# Patient Record
Sex: FEMALE
[De-identification: names, ages, dates, MRNs, and addresses within clinical notes are randomized per-mention and may not be internally consistent; named-entity substitution may affect disease eponyms.]

---

## 2018-04-12 ENCOUNTER — HOSPITAL ENCOUNTER (INPATIENT)
Dept: HOSPITAL 42 - ED | Age: 58
LOS: 1 days | Discharge: ANOTHER HOSPITAL | DRG: 871 | End: 2018-04-13
Attending: STUDENT IN AN ORGANIZED HEALTH CARE EDUCATION/TRAINING PROGRAM | Admitting: STUDENT IN AN ORGANIZED HEALTH CARE EDUCATION/TRAINING PROGRAM
Payer: COMMERCIAL

## 2018-04-12 VITALS — BODY MASS INDEX: 24.7 KG/M2

## 2018-04-12 DIAGNOSIS — R65.20: ICD-10-CM

## 2018-04-12 DIAGNOSIS — K70.10: ICD-10-CM

## 2018-04-12 DIAGNOSIS — E16.2: ICD-10-CM

## 2018-04-12 DIAGNOSIS — A41.9: Primary | ICD-10-CM

## 2018-04-12 DIAGNOSIS — N17.9: ICD-10-CM

## 2018-04-12 DIAGNOSIS — K72.00: ICD-10-CM

## 2018-04-12 DIAGNOSIS — I10: ICD-10-CM
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
## 2018-04-12 DIAGNOSIS — Z80.1: ICD-10-CM

## 2018-04-12 DIAGNOSIS — E86.0: ICD-10-CM

## 2018-04-12 DIAGNOSIS — F10.10: ICD-10-CM

## 2018-04-12 DIAGNOSIS — F17.210: ICD-10-CM

## 2018-04-12 DIAGNOSIS — K81.0: ICD-10-CM

## 2018-04-12 DIAGNOSIS — K76.0: ICD-10-CM

## 2018-04-12 DIAGNOSIS — E87.2: ICD-10-CM

## 2018-04-12 DIAGNOSIS — Z80.0: ICD-10-CM

## 2018-04-12 DIAGNOSIS — D68.4: ICD-10-CM

## 2018-04-12 LAB
ALBUMIN SERPL-MCNC: 3.5 G/DL (ref 3–4.8)
ALBUMIN SERPL-MCNC: 4.4 G/DL (ref 3–4.8)
ALBUMIN/GLOB SERPL: 1.5 {RATIO} (ref 1.1–1.8)
ALBUMIN/GLOB SERPL: 1.7 {RATIO} (ref 1.1–1.8)
AMYLASE SERPL-CCNC: 121 U/L (ref 35–125)
ANISOCYTOSIS BLD QL SMEAR: SLIGHT
APAP SERPL-MCNC: < 10 UG/ML (ref 10–20)
APPEARANCE UR: (no result)
APTT BLD: 42.4 SECONDS (ref 25.1–36.5)
BACTERIA #/AREA URNS HPF: (no result) /[HPF]
BASE EXCESS BLDV CALC-SCNC: -0.3 MMOL/L (ref 0–2)
BASE EXCESS BLDV CALC-SCNC: -1.7 MMOL/L (ref 0–2)
BASE EXCESS BLDV CALC-SCNC: -11.9 MMOL/L (ref 0–2)
BASOPHILS # BLD AUTO: 0.01 K/MM3 (ref 0–2)
BASOPHILS NFR BLD: 0.1 % (ref 0–3)
BILIRUB UR-MCNC: (no result) MG/DL
BUN SERPL-MCNC: 20 MG/DL (ref 7–21)
BUN SERPL-MCNC: 22 MG/DL (ref 7–21)
CALCIUM SERPL-MCNC: 10.8 MG/DL (ref 8.4–10.5)
CALCIUM SERPL-MCNC: 9.8 MG/DL (ref 8.4–10.5)
COLOR UR: (no result)
D DIMER PPP FEU-MCNC: > 5980 NG/ML (ref 0–243)
EOSINOPHIL # BLD: 0 10*3/UL (ref 0–0.7)
EOSINOPHIL NFR BLD: 0 % (ref 1.5–5)
ERYTHROCYTE [DISTWIDTH] IN BLOOD BY AUTOMATED COUNT: 14.5 % (ref 11.5–14.5)
GFR NON-AFRICAN AMERICAN: 36
GFR NON-AFRICAN AMERICAN: 39
GLUCOSE UR STRIP-MCNC: NEGATIVE MG/DL
GRANULOCYTES # BLD: 17.79 10*3/UL (ref 1.4–6.5)
GRANULOCYTES NFR BLD: 94.3 % (ref 50–68)
HGB BLD-MCNC: 14.3 G/DL (ref 12–16)
INR PPP: 5.01 (ref 0.93–1.08)
INR PPP: 8.62 (ref 0.93–1.08)
LEUKOCYTE ESTERASE UR-ACNC: (no result) LEU/UL
LIPASE SERPL-CCNC: 108 U/L (ref 23–300)
LYMPHOCYTE: 4 % (ref 22–35)
LYMPHOCYTES # BLD: 0.7 10*3/UL (ref 1.2–3.4)
LYMPHOCYTES NFR BLD AUTO: 3.8 % (ref 22–35)
MACROCYTES BLD QL SMEAR: SLIGHT
MCH RBC QN AUTO: 34.6 PG (ref 25–35)
MCHC RBC AUTO-ENTMCNC: 33.6 G/DL (ref 31–37)
MCV RBC AUTO: 103.1 FL (ref 80–105)
MONOCYTE: 1 % (ref 1–6)
MONOCYTES # BLD AUTO: 0.3 10*3/UL (ref 0.1–0.6)
MONOCYTES NFR BLD: 1.8 % (ref 1–6)
NEUTROPHILS NFR BLD AUTO: 90 % (ref 50–70)
NEUTS BAND NFR BLD: 5 % (ref 0–2)
PH BLDV: 7.17 [PH] (ref 7.32–7.43)
PH BLDV: 7.43 [PH] (ref 7.32–7.43)
PH BLDV: 7.46 [PH] (ref 7.32–7.43)
PH UR STRIP: 5.5 [PH] (ref 4.7–8)
PLATELET # BLD EST: NORMAL 10*3/UL
PLATELET # BLD: 230 10^3/UL (ref 120–450)
PMV BLD AUTO: 10.8 FL (ref 7–11)
PROT UR STRIP-MCNC: 100 MG/DL
PROTHROMBIN TIME: 102.7 SECONDS (ref 9.4–12.5)
PROTHROMBIN TIME: 59.5 SECONDS (ref 9.4–12.5)
RBC # BLD AUTO: 4.13 10^6/UL (ref 3.5–6.1)
RBC # UR STRIP: (no result) /UL
SALICYLATES SERPL-MCNC: < 1 MG/DL (ref 2–20)
SP GR UR STRIP: >= 1.03 (ref 1–1.03)
TROPONIN I SERPL-MCNC: 0.01 NG/ML
UROBILINOGEN UR STRIP-ACNC: 0.2 E.U./DL
VENOUS BLOOD FIO2: 21 %
VENOUS BLOOD GAS PCO2: 32 (ref 40–60)
VENOUS BLOOD GAS PCO2: 33 (ref 40–60)
VENOUS BLOOD GAS PCO2: 45 (ref 40–60)
VENOUS BLOOD GAS PO2: 41 MM/HG (ref 30–55)
VENOUS BLOOD GAS PO2: 50 MM/HG (ref 30–55)
VENOUS BLOOD GAS PO2: 71 MM/HG (ref 30–55)
WBC # BLD AUTO: 18.8 10^3/UL (ref 4.5–11)

## 2018-04-12 PROCEDURE — 30233K1 TRANSFUSION OF NONAUTOLOGOUS FROZEN PLASMA INTO PERIPHERAL VEIN, PERCUTANEOUS APPROACH: ICD-10-PCS | Performed by: STUDENT IN AN ORGANIZED HEALTH CARE EDUCATION/TRAINING PROGRAM

## 2018-04-12 RX ADMIN — PIPERACILLIN SODIUM AND TAZOBACTAM SODIUM SCH MLS/HR: .25; 2 INJECTION, POWDER, LYOPHILIZED, FOR SOLUTION INTRAVENOUS at 18:55

## 2018-04-12 RX ADMIN — PREDNISOLONE SODIUM PHOSPHATE SCH MG: 15 SOLUTION ORAL at 18:59

## 2018-04-12 NOTE — CP.PCM.CON
History of Present Illness





- History of Present Illness


History of Present Illness: 


Surgery Consult Note (Lei):





57 year old female with a past medical history significant for alcohol abuse (

last drink 48 hours PTA), tobacco abuse and HTN who presented with SOB, chest 

pain and abdominal pain of three days duration. Patient reports that her 

abdominal pain started three days ago while at rest. She describes the pain as 

diffusely sharp and cramping that has been constant since it started. She 

denies relation of the abdominal pain to PO intake. However, two days ago 

patient tried to drink a diet fudge soda and immediately experienced non-bloody 

emesis. Since that time, she has put herself on a clear liquid diet and reports 

that she has had no episodes of vomiting. She reports that she has not had a BM 

since the pain started but that she is passing flatus with small amounts of 

mucus. She denies recent hospitalization, recent travel, recent/current 

antibiotic use, fever, chills, weight loss, dysphagia, nausea, diarrhea, 

constipation, changes in urine output, skin changes or any numbness/tingling of 

any extremity.





PMH: As written above


PSH:  (~) and ?perineural cyst removal


Family History: Mother-Lung cancer and Father: Colon Cancer and MS


Social History: Current pack per day smoker with 30 year pack smoking history, 

chronic alcohol abuse (binge drinking) and denies any illicit drug use; Lives 

at home with daughter


Allergies: NKDA


Home Medications: Denies





PMD: Jazmyn





Review of Systems





- Review of Systems


Review of Systems: 


As per HPI, otherwise negative





Past Patient History





- Past Social History


Smoking Status: Never Smoked





- CARDIAC


Hx Cardiac Disorders: No





- PULMONARY


Hx Respiratory Disorders: No





- NEUROLOGICAL


Hx Neurological Disorder: No





- HEENT


Hx HEENT Problems: No





- RENAL


Hx Chronic Kidney Disease: No





- ENDOCRINE/METABOLIC


Hx Endocrine Disorders: No





- HEMATOLOGICAL/ONCOLOGICAL


Hx Blood Disorders: No





- INTEGUMENTARY


Hx Dermatological Problems: No





- MUSCULOSKELETAL/RHEUMATOLOGICAL


Hx Musculoskeletal Disorders: No





- GASTROINTESTINAL


Hx Gastrointestinal Disorders: No





- GENITOURINARY/GYNECOLOGICAL


Hx Genitourinary Disorders: No





- PSYCHIATRIC


Hx Depression: No


Hx Emotional Abuse: No


Hx Physical Abuse: No


Hx Substance Use: No





- SURGICAL HISTORY


Hx Surgeries: No





Meds


Allergies/Adverse Reactions: 


 Allergies











Allergy/AdvReac Type Severity Reaction Status Date / Time


 


No Known Allergies Allergy   Verified 18 11:07














- Medications


Medications: 


 Current Medications





Sodium Chloride (Sodium Chloride 0.9%)  1,000 mls @ 100 mls/hr IV .Q10H MELLISSA


   Last Admin: 18 12:43 Dose:  100 mls/hr











Physical Exam





- Constitutional


Appears: Non-toxic, No Acute Distress





- Head Exam


Head Exam: ATRAUMATIC, NORMOCEPHALIC





- Eye Exam


Eye Exam: EOMI, Scleral icterus.  absent: Normal appearance


Pupil Exam: NORMAL ACCOMODATION, PERRL





- ENT Exam


ENT Exam: Mucous Membranes Dry





- Neck Exam


Neck exam: Positive for: Full Rom





- Respiratory Exam


Respiratory Exam: NORMAL BREATHING PATTERN.  absent: Accessory Muscle Use





- Cardiovascular Exam


Cardiovascular Exam: Tachycardia, REGULAR RHYTHM





- GI/Abdominal Exam


GI & Abdominal Exam: Hypoactive Bowel Sounds, Soft, Tenderness (Diffusely TTP; 

Increased TTP in RUQ noted with positive murphys sign).  absent: Distended, Firm

, Guarding, Hernia, Rebound, Rigid





- Extremities Exam


Extremities exam: Positive for: normal capillary refill, pedal pulses present





- Neurological Exam


Neurological exam: Alert, Oriented x3





- Psychiatric Exam


Psychiatric exam: Normal Mood





- Skin


Skin Exam: Dry, Warm





Results





- Vital Signs


Recent Vital Signs: 


 Last Vital Signs











Temp  94.9 F L  18 12:49


 


Pulse  99 H  18 13:40


 


Resp  19   18 13:40


 


BP  108/74   18 13:40


 


Pulse Ox  100   18 13:40














- Labs


Result Diagrams: 


 18 12:08





 18 12:08


Labs: 


 Laboratory Results - last 24 hr











  18





  11:34 12:08 12:08


 


WBC   


 


RBC   


 


Hgb   


 


Hct   


 


MCV   


 


MCH   


 


MCHC   


 


RDW   


 


Plt Count   


 


MPV   


 


Gran %   


 


Lymph % (Auto)   


 


Mono % (Auto)   


 


Eos % (Auto)   


 


Baso % (Auto)   


 


Gran #   


 


Lymph # (Auto)   


 


Mono # (Auto)   


 


Eos # (Auto)   


 


Baso # (Auto)   


 


Neutrophils % (Manual)   


 


Band Neutrophils %   


 


Lymphocytes % (Manual)   


 


Monocytes % (Manual)   


 


Platelet Evaluation   


 


Anisocytosis (manual)   


 


Macrocytosis (manual)   


 


PT   


 


INR   


 


APTT   


 


D-Dimer, Quantitative   


 


pO2   


 


VBG pH   


 


VBG pCO2   


 


VBG HCO3   


 


VBG Total CO2   


 


VBG O2 Sat (Calc)   


 


VBG Base Excess   


 


VBG Potassium   


 


Glucose   


 


Lactate   


 


FiO2   


 


Sodium   135 


 


Potassium   3.8 


 


Chloride   91 L 


 


Carbon Dioxide   17 L 


 


Anion Gap   31 H 


 


BUN   20 


 


Creatinine   1.5 H 


 


Est GFR ( Amer)   43 


 


Est GFR (Non-Af Amer)   36 


 


POC Glucose (mg/dL)  < 20 L*  


 


Random Glucose   24 L* 


 


Calcium   10.8 H 


 


Phosphorus   


 


Magnesium   1.4 L 


 


Total Bilirubin   4.9 H 


 


AST   41261 H 


 


ALT   6554 H 


 


Alkaline Phosphatase   162 H 


 


Lactate Dehydrogenase   53289 H 


 


Total Creatine Kinase   79 


 


Troponin I   0.01 


 


Total Protein   7.0 


 


Albumin   4.4 


 


Globulin   2.6 


 


Albumin/Globulin Ratio   1.7 


 


Amylase   121 


 


Lipase   108 


 


Venous Blood Potassium   


 


Salicylates    < 1 L


 


Acetaminophen    < 10.0 L


 


Alcohol, Quantitative   


 


Influenza Typ A,B (EIA)   


 


Blood Type   


 


Antibody Screen   


 


BBK History Checked   














  18





  12:08 12:08 12:08


 


WBC   18.8 H 


 


RBC   4.13 


 


Hgb   14.3 


 


Hct   42.6 


 


MCV   103.1 


 


MCH   34.6 


 


MCHC   33.6 


 


RDW   14.5 


 


Plt Count   230 


 


MPV   10.8 


 


Gran %   94.3 H 


 


Lymph % (Auto)   3.8 L 


 


Mono % (Auto)   1.8 


 


Eos % (Auto)   0.0 L 


 


Baso % (Auto)   0.1 


 


Gran #   17.79 H 


 


Lymph # (Auto)   0.7 L 


 


Mono # (Auto)   0.3 


 


Eos # (Auto)   0.0 


 


Baso # (Auto)   0.01 


 


Neutrophils % (Manual)   90 H 


 


Band Neutrophils %   5 H 


 


Lymphocytes % (Manual)   4 L 


 


Monocytes % (Manual)   1 


 


Platelet Evaluation   Normal 


 


Anisocytosis (manual)   Slight 


 


Macrocytosis (manual)   Slight 


 


PT    59.5 H


 


INR    5.01 H*


 


APTT    42.4 H


 


D-Dimer, Quantitative    > 5980 H


 


pO2   


 


VBG pH   


 


VBG pCO2   


 


VBG HCO3   


 


VBG Total CO2   


 


VBG O2 Sat (Calc)   


 


VBG Base Excess   


 


VBG Potassium   


 


Glucose   


 


Lactate   


 


FiO2   


 


Sodium   


 


Potassium   


 


Chloride   


 


Carbon Dioxide   


 


Anion Gap   


 


BUN   


 


Creatinine   


 


Est GFR ( Amer)   


 


Est GFR (Non-Af Amer)   


 


POC Glucose (mg/dL)   


 


Random Glucose   


 


Calcium   


 


Phosphorus   


 


Magnesium   


 


Total Bilirubin   


 


AST   


 


ALT   


 


Alkaline Phosphatase   


 


Lactate Dehydrogenase   


 


Total Creatine Kinase   


 


Troponin I   


 


Total Protein   


 


Albumin   


 


Globulin   


 


Albumin/Globulin Ratio   


 


Amylase   


 


Lipase   


 


Venous Blood Potassium   


 


Salicylates   


 


Acetaminophen   


 


Alcohol, Quantitative  11 H  


 


Influenza Typ A,B (EIA)   


 


Blood Type   


 


Antibody Screen   


 


BBK History Checked   














  18





  12:23 12:23 12:23


 


WBC   


 


RBC   


 


Hgb   


 


Hct   


 


MCV   


 


MCH   


 


MCHC   


 


RDW   


 


Plt Count   


 


MPV   


 


Gran %   


 


Lymph % (Auto)   


 


Mono % (Auto)   


 


Eos % (Auto)   


 


Baso % (Auto)   


 


Gran #   


 


Lymph # (Auto)   


 


Mono # (Auto)   


 


Eos # (Auto)   


 


Baso # (Auto)   


 


Neutrophils % (Manual)   


 


Band Neutrophils %   


 


Lymphocytes % (Manual)   


 


Monocytes % (Manual)   


 


Platelet Evaluation   


 


Anisocytosis (manual)   


 


Macrocytosis (manual)   


 


PT   


 


INR   


 


APTT   


 


D-Dimer, Quantitative   


 


pO2  50  


 


VBG pH  7.17 L*  


 


VBG pCO2  45.0  


 


VBG HCO3  16.4 L  


 


VBG Total CO2  17.8 L  


 


VBG O2 Sat (Calc)  86.1 H  


 


VBG Base Excess  -11.9 L  


 


VBG Potassium  3.9  


 


Glucose  163 H  


 


Lactate  16.4 H*  


 


FiO2  21.0  


 


Sodium  131.0 L  


 


Potassium   


 


Chloride  87.0 L  


 


Carbon Dioxide   


 


Anion Gap   


 


BUN   


 


Creatinine   


 


Est GFR ( Amer)   


 


Est GFR (Non-Af Amer)   


 


POC Glucose (mg/dL)   


 


Random Glucose   


 


Calcium   


 


Phosphorus   


 


Magnesium   


 


Total Bilirubin   


 


AST   


 


ALT   


 


Alkaline Phosphatase   


 


Lactate Dehydrogenase   


 


Total Creatine Kinase   


 


Troponin I   


 


Total Protein   


 


Albumin   


 


Globulin   


 


Albumin/Globulin Ratio   


 


Amylase   


 


Lipase   


 


Venous Blood Potassium  3.9  


 


Salicylates   


 


Acetaminophen   


 


Alcohol, Quantitative   


 


Influenza Typ A,B (EIA)    Negative for flu a/b


 


Blood Type   A POSITIVE 


 


Antibody Screen   Negative 


 


BBK History Checked   No verified bt 














  18





  12:37 12:55


 


WBC  


 


RBC  


 


Hgb  


 


Hct  


 


MCV  


 


MCH  


 


MCHC  


 


RDW  


 


Plt Count  


 


MPV  


 


Gran %  


 


Lymph % (Auto)  


 


Mono % (Auto)  


 


Eos % (Auto)  


 


Baso % (Auto)  


 


Gran #  


 


Lymph # (Auto)  


 


Mono # (Auto)  


 


Eos # (Auto)  


 


Baso # (Auto)  


 


Neutrophils % (Manual)  


 


Band Neutrophils %  


 


Lymphocytes % (Manual)  


 


Monocytes % (Manual)  


 


Platelet Evaluation  


 


Anisocytosis (manual)  


 


Macrocytosis (manual)  


 


PT  


 


INR  


 


APTT  


 


D-Dimer, Quantitative  


 


pO2  


 


VBG pH  


 


VBG pCO2  


 


VBG HCO3  


 


VBG Total CO2  


 


VBG O2 Sat (Calc)  


 


VBG Base Excess  


 


VBG Potassium  


 


Glucose  


 


Lactate  


 


FiO2  


 


Sodium  


 


Potassium  


 


Chloride  


 


Carbon Dioxide  


 


Anion Gap  


 


BUN  


 


Creatinine  


 


Est GFR ( Amer)  


 


Est GFR (Non-Af Amer)  


 


POC Glucose (mg/dL)  113 H 


 


Random Glucose  


 


Calcium  


 


Phosphorus   5.3 H


 


Magnesium  


 


Total Bilirubin  


 


AST  


 


ALT  


 


Alkaline Phosphatase  


 


Lactate Dehydrogenase  


 


Total Creatine Kinase  


 


Troponin I  


 


Total Protein  


 


Albumin  


 


Globulin  


 


Albumin/Globulin Ratio  


 


Amylase  


 


Lipase  


 


Venous Blood Potassium  


 


Salicylates  


 


Acetaminophen  


 


Alcohol, Quantitative  


 


Influenza Typ A,B (EIA)  


 


Blood Type  


 


Antibody Screen  


 


BBK History Checked  














- EKG Data


EKG shows normal: Sinus rhythm


Rate: Tachycardia





Assessment & Plan





- Assessment and Plan (Free Text)


Assessment: 


57 year old female with RUQ abdominal pain, sepsis and elevated LFT's. Surgery 

was consulted for evaluation of ?peritonitis vs. acute hepatic/biliary pathology


Plan: 





-CT Abdomen/Pelvis reviewed


-F/U Abdominal U/S


-Continue IVF and antibiotics


-Will discuss with attending








RENÉ Malik PGY1





- Date & Time


Date: 18


Time: 14:23

## 2018-04-12 NOTE — ED PDOC
Arrival/HPI





- General


Chief Complaint: Shortness Of Breath


Time Seen by Provider: 04/12/18 11:21


Historian: Patient, Family (daughter)





- History of Present Illness


Narrative History of Present Illness (Text): 





04/12/18 12:05


Patient is a 56 yo female, smokes at least 1 pack per day, also with history of 

"drinking on and off" presents to the Emergency Department with history of 

cough "for a while" but over past two days has had pain to her chest and left 

arm, as well as increased abdominal pain over past 2-3 days per her history. 

She also states that over the past "several days" she has been very tired and 

is too weak to walk down two flights of steps today. States chest and abdominal 

pain is worse with coughing, not worse with deep breaths. Denies calf pain or 

swelling. States that she feels her stomach is bloated. Reports dark stools at 

times, but no gross blood. Patient has had intermittent dizziness and blurred 

vision over "past few weeks". She denies headache. Denies focal arm or leg 

weakness. She states that she has not had an appetite for several days.


Patient states last drink was on Monday, daughter believes that she last had 

drink of alcohol 2 days ago. Daughter states that patient appeared to be 

"slurred" when she spoke to her this morning. No trauma reported. No bruising 

reported.





Past Medical History





- Past Medical History


Past Medical History: No Previous





- Cardiac


Hx Cardiac Disorders: No





- Pulmonary


Hx Respiratory Disorders: No





- Neurological


Hx Neurological Disorder: No





- HEENT


Hx HEENT Disorder: No





- Renal


Hx Renal Disorder: No





- Endocrine/Metabolic


Hx Endocrine Disorders: No





- Hematological/Oncological


Hx Blood Disorders: No





- Integumentary


Hx Dermatological Disorder: No





- Musculoskeletal/Rheumatological


Hx Musculoskeletal Disorders: No





- Gastrointestinal


Hx Gastrointestinal Disorders: No





- Genitourinary/Gynecological


Hx Genitourinary Disorders: No





- Psychiatric


Hx Depression: No


Hx Emotional Abuse: No


Hx Physical Abuse: No


Hx Substance Use: No





- Past Surgical History


Past Surgical History: No Previous





- Suicidal Assessment


Feels Threatened In Home Enviroment: No





Family/Social History


Family/Social History: Unknown Family HX


Smoking Status: Never Smoked


Hx Alcohol Use: No


Hx Substance Use: No





Allergies/Home Meds


Allergies/Adverse Reactions: 


Allergies





No Known Allergies Allergy (Verified 04/12/18 11:07)


 








Home Medications: 


 Home Meds











 Medication  Instructions  Recorded  Confirmed


 


No Known Home Med [No Known Home  08/15/14 04/12/18





Med]   














Review of Systems





- Review of Systems


Systems not reviewed;Unavailable: Other (patient is poor historian)


Constitutional: Fatigue, Weight Change.  absent: Fevers, Night Sweats


Eyes: Vision Changes (intermittent blurred vision).  absent: Eye Pain


ENT: absent: Hearing Changes, Sore Throat


Respiratory: SOB, Cough.  absent: Sputum, Wheezing


Cardiovascular: Chest Pain, DESAI.  absent: Palpitations, Edema, Calf Pain, 

Orthopnea, Syncope


Gastrointestinal: Abdominal Pain, Stool Changes, Nausea, Appetite Changes, 

Other (occasional dark stools).  absent: Constipation, Diarrhea, Vomiting, 

Hematochezia, Hematemesis, Food Intolerance


Genitourinary Female: absent: Vaginal Bleeding, Vaginal Discharge


Musculoskeletal: absent: Back Pain, Neck Pain


Skin: absent: Rash


Neurological: Dizziness.  absent: Headache, Focal Weakness


Endocrine: absent: Polyuria


Hemo/Lymphatic: absent: Easy Bleeding


Psychiatric: absent: Anxiety, Depression





Physical Exam


Vital Signs Reviewed: Yes


Vital Signs











  Temp Pulse Resp BP Pulse Ox


 


 04/12/18 15:07  96.9 F L    


 


 04/12/18 14:07   95 H  19  120/71  99


 


 04/12/18 13:40   99 H  19  108/74  100


 


 04/12/18 12:49  94.9 F L    


 


 04/12/18 11:23    19  


 


 04/12/18 11:07  97.5 F L  115 H  22  143/70  98











Temperature: Afebrile


Blood Pressure: Hypertensive


Pulse: Tachycardic


Respiratory Rate: Normal


Appearance: Positive for: Ill-Appearing, Uncomfortable


Pain Distress: Moderate


Mental Status: Positive for: Alert and Oriented X 3


Finger Stick Blood Glucose: 20





- Systems Exam


Head: Present: Atraumatic, Normocephalic


Pupils: Present: PERRL


Extroacular Muscles: Present: EOMI


Conjunctiva: Present: Icteric


Mouth: Present: Dry


Pharnyx: No: ERYTHEMA, Strider


Nose (Internal): Present: Normal Inspection


Neck: Present: Normal Range of Motion.  No: Meningeal Signs, MIDLINE TENDERNESS


Respiratory/Chest: Present: Clear to Auscultation.  No: Wheezes, Rales, Rhonchi


Cardiovascular: Present: Murmurs, Tachycardic


Abdomen: Present: Tenderness, Distention, Other (diffuse tenderness, no 

pulsatile masses, no incarcerated hernia).  No: Rebound, Guarding


Rectal: No: Gross Blood


Upper Extremity: Present: NORMAL PULSES, Other (pain on palpation of right 

upper shoulder, worse with ROM).  No: Edema


Lower Extremity: Present: NORMAL PULSES, Neurovascularly Intact.  No: Edema


Neurological: Present: Motor Func Grossly Intact, Normal Sensory Function


Skin: Present: Pale


Psychiatric: Present: Alert, Normal Insight, Normal Concentration





Medical Decision Making


ED Course and Treatment: 





04/12/18 12:15


Patient is a 56 yo female presents with daughter with complaints of cough, 

chest pain, abdominal pain, weakness. History is supplemented by daughter. 

Patient admits that she is a heavy smoker and "will drink heavy on and off".


On initial exam, she is found to have bedside glucose "less than 20". She is 

alert and answering questions appropriately despite this. IV line established 

and patient given amp of D50. She denies prior history of diabetes. She admits 

that she has not seen her PMD for several years.


Her initial exam reveals clear lungs, oxygen saturations of 99% on room air. 

Exam notable for diffuse abdominal pain, with distension.


Initial ddx includes alcohol withdrawal, gi bleed, sepsis, pancreatitis, 

hepatitis, coronary artery disease, pulmonary embolism, COPD, dehydration, 

peritonitis. Not limited to this.





Labs drawn and pending. IV fluids ordered and pepcid ordered.


Plan to obtain CT chest/abdomen/pelvis.


Treatment plan reviewed with patient and daughter.


Currently no focal weakness noted on exam with no facial droop or slurred 

speech noted.


Suspect earlier described slurred speech at home due to low blood sugar, 

although if this persists will consider Head CT.





04/12/2018 12:11


Chest X-ray


IMPRESSION: No active disease.


Dictator: Alvaro Abrams MD





04/12/18 12:37


Code Sepsis called.





04/12/2018 13:56


Chest/Abd/Pelvis CT


IMPRESSION:


Mild mural thickening in the descending and sigmoid colo, possible colitis.


There is some stranding of the fat planes anterior to the pancreas, possible 

pancreatitis.


Sever fatty infiltration of the liver. Dense sludge in the gallbladder.


Dictator: Alvaro Abrams MD





04/12/2018 14:53


Abdominal Ultrasound


IMPRESSION: 


1. Gallbladder wall thickening, pericholecystic fluid and hyperemia in the 

anterior gallbladder wall could represent acute cholecystitis in the 

appropriate clinical setting. Clinical correlation and follow-up is advised.


2. Mild hepatomegaly and hepatic steatosis.


Dictator: Argentina Sanford MD





There is significant elevation of ast, alt, bilirubin, ldh. Suspect acute liver 

failure based on labs, possible component of alcoholic hepatitis but suspicious 

of other acute infectious or inflammatory process given history and extremely 

abnormal labs.


She has significant improvement after d50 in terms of her interaction and is at 

her baseline. 


I suspect initial slurred speech at home due to hypoglycemia as resolved after 

d50.





INR , pt, ptt significantly elevated, currently no melena or active bleeding 

noted although concerning for acute liver failure.


Dr. Ruelas intensivist consulted.


Oncall gastroenterologist Dr. Shukla consulted directly by me through ED and 

abnormal labs, patient's exam and vital signs commnicated.


Patient with serial exams is alert, conversive, comfortable, ambulatory.


Given ct and ultarsound findings surgery consulted. IV anbitiotics initiated 

and iv fluids continued.


Patient and daughter updated with treatment plan, abnormal labs, ddx at this 

moment and need for icu monitoring given acute renal failure and serial exams.


After consultation with intensivist, acetadote ordered.


Dr. Shukla to evaluate patient.


Care turned over to icu team and consultants at 1700.





Patient with resolution of chest pain, and has equal and strong pulses in both 

upper extremities. Ddimer elevated although I suspect this is related to 

underlying sepsis and acute liver failure, as patient currently with no chest 

pain or pleuritic discomfort or hypoxia will admit to icu for serial exams and 

further follow-up of labs and symptoms.





- Critical Care


Critical Care Minutes: 60 minutes





- Lab Interpretations


Lab Results: 








 04/12/18 12:08 





 04/12/18 12:08 





 Lab Results





04/12/18 13:07: Blood Type Confirm A POSITIVE


04/12/18 12:55: Phosphorus 5.3 H


04/12/18 12:37: POC Glucose (mg/dL) 113 H


04/12/18 12:23: Influenza Typ A,B (EIA) Negative for flu a/b


04/12/18 12:23: Blood Type A POSITIVE, Antibody Screen Negative, BBK History 

Checked No verified bt


04/12/18 12:23: pO2 50, VBG pH 7.17 L*, VBG pCO2 45.0, VBG HCO3 16.4 L, VBG 

Total CO2 17.8 L, VBG O2 Sat (Calc) 86.1 H, VBG Base Excess -11.9 L, VBG 

Potassium 3.9, Glucose 163 H, Lactate 16.4 H*, FiO2 21.0, Sodium 131.0 L, 

Chloride 87.0 L, Venous Blood Potassium 3.9


04/12/18 12:08: PT 59.5 H, INR 5.01 H*, APTT 42.4 H, D-Dimer, Quantitative > 

5980 H


04/12/18 12:08: WBC 18.8 H, RBC 4.13, Hgb 14.3, Hct 42.6, .1, MCH 34.6, 

MCHC 33.6, RDW 14.5, Plt Count 230, MPV 10.8, Gran % 94.3 H, Lymph % (Auto) 3.8 

L, Mono % (Auto) 1.8, Eos % (Auto) 0.0 L, Baso % (Auto) 0.1, Gran # 17.79 H, 

Lymph # (Auto) 0.7 L, Mono # (Auto) 0.3, Eos # (Auto) 0.0, Baso # (Auto) 0.01, 

Neutrophils % (Manual) 90 H, Band Neutrophils % 5 H, Lymphocytes % (Manual) 4 L

, Monocytes % (Manual) 1, Platelet Evaluation Normal, Anisocytosis (manual) 

Slight, Macrocytosis (manual) Slight


04/12/18 12:08: Alcohol, Quantitative 11 H


04/12/18 12:08: Salicylates < 1 L, Acetaminophen < 10.0 L


04/12/18 12:08: Sodium 135, Potassium 3.8, Chloride 91 L, Carbon Dioxide 17 L, 

Anion Gap 31 H, BUN 20, Creatinine 1.5 H, Est GFR ( Amer) 43, Est GFR (

Non-Af Amer) 36, Random Glucose 24 L*, Calcium 10.8 H, Magnesium 1.4 L, Total 

Bilirubin 4.9 H, AST 30083 H, ALT 6554 H, Alkaline Phosphatase 162 H, Lactate 

Dehydrogenase 32212 H, Total Creatine Kinase 79, Troponin I 0.01, Total Protein 

7.0, Albumin 4.4, Globulin 2.6, Albumin/Globulin Ratio 1.7, Amylase 121, Lipase 

108


04/12/18 11:34: POC Glucose (mg/dL) < 20 L*











- RAD Interpretation


Radiology Orders: 








04/12/18 11:47


CHEST PORTABLE [RAD] Stat 





04/12/18 12:39


CHEST,ABDOMEN, PELVIS W/O CONT [CT] Stat 





04/12/18 13:27


ABDOMEN COMPLETE [US] Stat 











: Radiologist





- EKG Interpretation


EKG Interpretation (Text): 





04/12/18 12:14


EKG at 11:04 sinus tachycardia rate of 114


Interpreted by ED Physician: Yes


Type: 12 lead EKG





- Medication Orders


Current Medication Orders: 








Piperacillin Sod/Tazobactam Sod (Zosyn 2.25 Gm In 0.9% 100 Ml)  2.25 gm in 100 

mls @ 200 mls/hr IVPB Q6 MELLISSA


   PRN Reason: Protocol


   Last Admin: 04/12/18 18:55  Dose: 200 mls/hr





eMAR Start Stop


 Document     04/12/18 18:55  JUR  (Rec: 04/12/18 18:56  JUR  [facility]-[id number])


     Intravenous Solution


      Start Date                                 04/12/18


      Start Time                                 18:56


      End Date                                   04/12/18


      End time                                   19:16


      Total Infusion Time                        20





Acetylcysteine 3,000 mg/ (Dextrose)  515 mls @ 125 mls/hr IV .Q4H8M ONE


   PRN Reason: Protocol


   Stop: 04/12/18 23:07


   Last Admin: 04/12/18 19:00  Dose: 125 mls/hr





eMAR Start Stop


 Document     04/12/18 19:00  JUR  (Rec: 04/12/18 19:00  JUR  [facility]-[id number])


     Intravenous Solution


      Start Date                                 04/12/18


      Start Time                                 19:00





Acetylcysteine 6,120 mg/ (Dextrose)  1,030.6 mls @ 62.5 mls/hr IVPB ONCE ONE


   Stop: 04/13/18 15:29


Dextrose/Sodium Chloride (Dextrose 5%/0.9% Ns 1000 Ml)  1,000 mls @ 100 mls/hr 

IV .Q10H MELLISSA


   Last Admin: 04/12/18 21:51  Dose: 100 mls/hr





eMAR Start Stop


 Document     04/12/18 21:51  B.P  (Rec: 04/12/18 21:51  B.P  [facility]-[id number])


     Intravenous Solution


      Start Date                                 04/12/18


      Start Time                                 21:51





Lactulose (Enulose)  30 gm PO BID [facility]


   Last Admin: 04/12/18 19:42  Dose: 30 gm





Prednisolone (Prednisolone Oral Soln)  40 mg PO DAILY [facility]


   Last Admin: 04/12/18 18:59  Dose: 40 mg





Discontinued Medications





Dextrose (Dextrose 50% Inj)  50 ml IVP ONCE ONE


   Stop: 04/12/18 11:49


   Last Admin: 04/12/18 11:48  Dose: 50 ml





IVP Administration


 Document     04/12/18 11:48  LA  (Rec: 04/12/18 12:45  LA  [id number])


     Charges for Administration


      # of IVP Administrations                   1





Famotidine (Pepcid)  20 mg IVP STAT STA


   Stop: 04/12/18 11:49


   Last Admin: 04/12/18 12:43  Dose: 20 mg





IVP Administration


 Document     04/12/18 12:43  LA  (Rec: 04/12/18 12:43  LA  [id number])


     Charges for Administration


      # of IVP Administrations                   1





Sodium Chloride (Sodium Chloride 0.9%)  1,000 mls @ 100 mls/hr IV .Q10H [facility]


   Last Admin: 04/12/18 12:43  Dose: 100 mls/hr





eMAR Start Stop


 Document     04/12/18 12:43  LA  (Rec: 04/12/18 12:44  LA  [id number])


     Intravenous Solution


      Start Date                                 04/12/18


      Start Time                                 12:43


      End Date                                   04/12/18


      End time                                   12:53


      Total Infusion Time                        10





Lactated Ringer's 1,770 ml/ IV (SUPPLIES)  1,770 mls @ 3,538.02 mls/hr IV ONCE 

ONE


   PRN Reason: 60 ML/KG/HR


   Stop: 04/12/18 12:38


   Last Admin: 04/12/18 12:53  Dose: 3,538.02 mls/hr





eMAR Start Stop


 Document     04/12/18 12:53  LA  (Rec: 04/12/18 12:54  LA  [id number])


     Intravenous Solution


      Start Date                                 04/12/18


      Start Time                                 12:54


      End Date                                   04/12/18


      End time                                   13:25


      Total Infusion Time                        31





Piperacillin Sod/Tazobactam Sod (Zosyn 4.5 Gm In Ns 100ml)  4.5 gm in 100 mls @ 

200 mls/hr IVPB STAT STA


   PRN Reason: Protocol


   Stop: 04/12/18 13:10


   Last Admin: 04/12/18 12:56  Dose: 200 mls/hr





eMAR Start Stop


 Document     04/12/18 12:56  LA  (Rec: 04/12/18 12:57  LA  [id number])


     Intravenous Solution


      Start Date                                 04/12/18


      Start Time                                 12:56


      End Date                                   04/12/18


      End time                                   13:26


      Total Infusion Time                        30





Acetylcysteine 8,850 mg/ (Dextrose)  244.25 mls @ 200 mls/hr IV .Q1H14M ONE


   Stop: 04/12/18 16:00


   Last Admin: 04/12/18 15:28  Dose: 200 mls/hr





eMAR Start Stop


 Document     04/12/18 15:28  LA  (Rec: 04/12/18 15:29  LA  [id number])


     Intravenous Solution


      Start Date                                 04/12/18


      Start Time                                 15:29


      End Date                                   04/12/18


      End time                                   16:45


      Total Infusion Time                        76





Dextrose/Sodium Chloride (Dextrose 5%/0.45% Ns 1000 Ml)  1,000 mls @ 100 mls/hr 

IV .Q10H MELLISSA


   Last Admin: 04/12/18 15:32  Dose: 100 mls/hr





eMAR Start Stop


 Document     04/12/18 15:32  LA  (Rec: 04/12/18 15:33  LA  [id number])


     Intravenous Solution


      Start Date                                 04/12/18


      Start Time                                 15:32





Acetylcysteine 3,000 mg/ (Dextrose)  265 mls @ 62.5 mls/hr IV .Q4H15M ONE


   PRN Reason: Protocol


   Stop: 04/12/18 23:14


Phytonadione 10 mg/ Sodium (Chloride)  51 mls @ 100 mls/hr IV ONCE ONE


   Stop: 04/12/18 22:10











Disposition/Present on Arrival





- Present on Arrival


Any Indicators Present on Arrival: No


History of DVT/PE: No


History of Uncontrolled Diabetes: No


Urinary Catheter: No


History of Decub. Ulcer: No


History Surgical Site Infection Following: None





- Disposition


Have Diagnosis and Disposition been Completed?: Yes


Diagnosis: 


 Acute liver failure, Abnormal coagulation profile, Leukocytosis, Sepsis, 

Abdominal pain, Colitis, Gallbladder disorder, Chest pain, Hypoglycemia





Disposition: HOSPITALIZED


Disposition Time: 15:00


Patient Plan: Admission, ICU


Patient Problems: 


 Current Active Problems











Problem Status Onset


 


Abdominal pain Acute  


 


Abnormal coagulation profile Acute  


 


Acute liver failure Acute  


 


Chest pain Acute  


 


Colitis Acute  


 


Gallbladder disorder Acute  


 


Hypoglycemia Acute  


 


Leukocytosis Acute  


 


Sepsis Acute  











Condition: CRITICAL

## 2018-04-12 NOTE — CP.PCM.CON
<Braydon Mix - Last Filed: 18 17:20>





History of Present Illness





- History of Present Illness


History of Present Illness: 





Critical Care Consult Note: 





57 year old female with a PMHx of ETOH abuse (last drink 3-4 days ago), tobacco 

abuse, and HTN who presents to the ED with SOB, chest pain and abdominal pain. 

Patient states that her symptoms initially started 3 days ago and have gotten 

progressively worse. The pain was initially just her chest but now includes her 

abdomen as well. She denies any radiation of her chest pain.  She also 

complains of progressive shortness of breath. The abdominal pain is constant, 

diffusely sharp and cramping 10/10 in severity at its worse. patient admits to 

binge drinking. She denies any complications associated with her drinking. 

Denies any recent ingestion of Tylenol. She denies recent hospitalization or 

recent travel. No other complaints. 





12 Point ROS performed and neg other than stated above. 





PMH: ETOH abuse (last drink 3-4 days ago), tobacco abuse, and HTN


PSH:  (~) and pilonidal cyst removal


Family History: Mother-Lung cancer and RA -  Father: Colon Cancer and MS


Social History: Current pack per day smoker with 30 year pack smoking history, 

chronic alcohol abuse (binge drinking) >10 years of drinking; and denies any 

illicit drug use; Lives at home with daughter


Allergies: NKDA








Review of Systems





- Review of Systems


All systems: reviewed and no additional remarkable complaints except (HPI)





Past Patient History





- Past Social History


Smoking Status: Never Smoked





- CARDIAC


Hx Cardiac Disorders: No





- PULMONARY


Hx Respiratory Disorders: No





- NEUROLOGICAL


Hx Neurological Disorder: No





- HEENT


Hx HEENT Problems: No





- RENAL


Hx Chronic Kidney Disease: No





- ENDOCRINE/METABOLIC


Hx Endocrine Disorders: No





- HEMATOLOGICAL/ONCOLOGICAL


Hx Blood Disorders: No





- INTEGUMENTARY


Hx Dermatological Problems: No





- MUSCULOSKELETAL/RHEUMATOLOGICAL


Hx Musculoskeletal Disorders: No





- GASTROINTESTINAL


Hx Gastrointestinal Disorders: No





- GENITOURINARY/GYNECOLOGICAL


Hx Genitourinary Disorders: No





- PSYCHIATRIC


Hx Depression: No


Hx Emotional Abuse: No


Hx Physical Abuse: No


Hx Substance Use: No





- SURGICAL HISTORY


Hx Surgeries: No





Meds


Allergies/Adverse Reactions: 


 Allergies











Allergy/AdvReac Type Severity Reaction Status Date / Time


 


No Known Allergies Allergy   Verified 18 11:07














- Medications


Medications: 


 Current Medications





Sodium Chloride (Sodium Chloride 0.9%)  1,000 mls @ 100 mls/hr IV .Q10H MELLISSA


   Last Admin: 18 12:43 Dose:  100 mls/hr


Acetylcysteine 8,850 mg/ (Dextrose)  244.25 mls @ 200 mls/hr IV .Q1H14M ONE


   Stop: 18 16:00


Dextrose/Sodium Chloride (Dextrose 5%/0.45% Ns 1000 Ml)  1,000 mls @ 100 mls/hr 

IV .Q10H [facility]











Physical Exam





- Constitutional


Appears: No Acute Distress





- Head Exam


Head Exam: ATRAUMATIC, NORMOCEPHALIC





- Eye Exam


Eye Exam: EOMI, PERRL





- ENT Exam


ENT Exam: Mucous Membranes Moist





- Respiratory Exam


Respiratory Exam: Clear to Auscultation Bilateral.  absent: Rales, Wheezes





- Cardiovascular Exam


Cardiovascular Exam: REGULAR RHYTHM, RRR, +S1, +S2





- GI/Abdominal Exam


GI & Abdominal Exam: Guarding, Normal Bowel Sounds, Tenderness.  absent: 

Distended





- Extremities Exam


Extremities exam: Negative for: calf tenderness, pedal edema





- Neurological Exam


Neurological exam: Alert, Oriented x3





- Psychiatric Exam


Psychiatric exam: Normal Affect, Normal Mood





- Skin


Skin Exam: Dry, Intact, Warm





Results





- Vital Signs


Recent Vital Signs: 


 Last Vital Signs











Temp  96.9 F L  18 15:07


 


Pulse  95 H  18 14:07


 


Resp  19   18 14:07


 


BP  120/71   18 14:07


 


Pulse Ox  99   18 14:07














- Labs


Result Diagrams: 


 18 12:08





 18 12:08


Labs: 


 Laboratory Results - last 24 hr











  18





  11:34 12:08 12:08


 


WBC   


 


RBC   


 


Hgb   


 


Hct   


 


MCV   


 


MCH   


 


MCHC   


 


RDW   


 


Plt Count   


 


MPV   


 


Gran %   


 


Lymph % (Auto)   


 


Mono % (Auto)   


 


Eos % (Auto)   


 


Baso % (Auto)   


 


Gran #   


 


Lymph # (Auto)   


 


Mono # (Auto)   


 


Eos # (Auto)   


 


Baso # (Auto)   


 


Neutrophils % (Manual)   


 


Band Neutrophils %   


 


Lymphocytes % (Manual)   


 


Monocytes % (Manual)   


 


Platelet Evaluation   


 


Anisocytosis (manual)   


 


Macrocytosis (manual)   


 


PT   


 


INR   


 


APTT   


 


D-Dimer, Quantitative   


 


pO2   


 


VBG pH   


 


VBG pCO2   


 


VBG HCO3   


 


VBG Total CO2   


 


VBG O2 Sat (Calc)   


 


VBG Base Excess   


 


VBG Potassium   


 


Glucose   


 


Lactate   


 


FiO2   


 


Sodium   135 


 


Potassium   3.8 


 


Chloride   91 L 


 


Carbon Dioxide   17 L 


 


Anion Gap   31 H 


 


BUN   20 


 


Creatinine   1.5 H 


 


Est GFR ( Amer)   43 


 


Est GFR (Non-Af Amer)   36 


 


POC Glucose (mg/dL)  < 20 L*  


 


Random Glucose   24 L* 


 


Calcium   10.8 H 


 


Phosphorus   


 


Magnesium   1.4 L 


 


Total Bilirubin   4.9 H 


 


AST   65169 H 


 


ALT   6554 H 


 


Alkaline Phosphatase   162 H 


 


Lactate Dehydrogenase   66629 H 


 


Total Creatine Kinase   79 


 


Troponin I   0.01 


 


Total Protein   7.0 


 


Albumin   4.4 


 


Globulin   2.6 


 


Albumin/Globulin Ratio   1.7 


 


Amylase   121 


 


Lipase   108 


 


Venous Blood Potassium   


 


Salicylates    < 1 L


 


Acetaminophen    < 10.0 L


 


Alcohol, Quantitative   


 


Influenza Typ A,B (EIA)   


 


Blood Type   


 


Blood Type Confirm   


 


Antibody Screen   


 


BBK History Checked   














  18





  12:08 12:08 12:08


 


WBC   18.8 H 


 


RBC   4.13 


 


Hgb   14.3 


 


Hct   42.6 


 


MCV   103.1 


 


MCH   34.6 


 


MCHC   33.6 


 


RDW   14.5 


 


Plt Count   230 


 


MPV   10.8 


 


Gran %   94.3 H 


 


Lymph % (Auto)   3.8 L 


 


Mono % (Auto)   1.8 


 


Eos % (Auto)   0.0 L 


 


Baso % (Auto)   0.1 


 


Gran #   17.79 H 


 


Lymph # (Auto)   0.7 L 


 


Mono # (Auto)   0.3 


 


Eos # (Auto)   0.0 


 


Baso # (Auto)   0.01 


 


Neutrophils % (Manual)   90 H 


 


Band Neutrophils %   5 H 


 


Lymphocytes % (Manual)   4 L 


 


Monocytes % (Manual)   1 


 


Platelet Evaluation   Normal 


 


Anisocytosis (manual)   Slight 


 


Macrocytosis (manual)   Slight 


 


PT    59.5 H


 


INR    5.01 H*


 


APTT    42.4 H


 


D-Dimer, Quantitative    > 5980 H


 


pO2   


 


VBG pH   


 


VBG pCO2   


 


VBG HCO3   


 


VBG Total CO2   


 


VBG O2 Sat (Calc)   


 


VBG Base Excess   


 


VBG Potassium   


 


Glucose   


 


Lactate   


 


FiO2   


 


Sodium   


 


Potassium   


 


Chloride   


 


Carbon Dioxide   


 


Anion Gap   


 


BUN   


 


Creatinine   


 


Est GFR ( Amer)   


 


Est GFR (Non-Af Amer)   


 


POC Glucose (mg/dL)   


 


Random Glucose   


 


Calcium   


 


Phosphorus   


 


Magnesium   


 


Total Bilirubin   


 


AST   


 


ALT   


 


Alkaline Phosphatase   


 


Lactate Dehydrogenase   


 


Total Creatine Kinase   


 


Troponin I   


 


Total Protein   


 


Albumin   


 


Globulin   


 


Albumin/Globulin Ratio   


 


Amylase   


 


Lipase   


 


Venous Blood Potassium   


 


Salicylates   


 


Acetaminophen   


 


Alcohol, Quantitative  11 H  


 


Influenza Typ A,B (EIA)   


 


Blood Type   


 


Blood Type Confirm   


 


Antibody Screen   


 


BBK History Checked   














  18





  12:23 12:23 12:23


 


WBC   


 


RBC   


 


Hgb   


 


Hct   


 


MCV   


 


MCH   


 


MCHC   


 


RDW   


 


Plt Count   


 


MPV   


 


Gran %   


 


Lymph % (Auto)   


 


Mono % (Auto)   


 


Eos % (Auto)   


 


Baso % (Auto)   


 


Gran #   


 


Lymph # (Auto)   


 


Mono # (Auto)   


 


Eos # (Auto)   


 


Baso # (Auto)   


 


Neutrophils % (Manual)   


 


Band Neutrophils %   


 


Lymphocytes % (Manual)   


 


Monocytes % (Manual)   


 


Platelet Evaluation   


 


Anisocytosis (manual)   


 


Macrocytosis (manual)   


 


PT   


 


INR   


 


APTT   


 


D-Dimer, Quantitative   


 


pO2  50  


 


VBG pH  7.17 L*  


 


VBG pCO2  45.0  


 


VBG HCO3  16.4 L  


 


VBG Total CO2  17.8 L  


 


VBG O2 Sat (Calc)  86.1 H  


 


VBG Base Excess  -11.9 L  


 


VBG Potassium  3.9  


 


Glucose  163 H  


 


Lactate  16.4 H*  


 


FiO2  21.0  


 


Sodium  131.0 L  


 


Potassium   


 


Chloride  87.0 L  


 


Carbon Dioxide   


 


Anion Gap   


 


BUN   


 


Creatinine   


 


Est GFR ( Amer)   


 


Est GFR (Non-Af Amer)   


 


POC Glucose (mg/dL)   


 


Random Glucose   


 


Calcium   


 


Phosphorus   


 


Magnesium   


 


Total Bilirubin   


 


AST   


 


ALT   


 


Alkaline Phosphatase   


 


Lactate Dehydrogenase   


 


Total Creatine Kinase   


 


Troponin I   


 


Total Protein   


 


Albumin   


 


Globulin   


 


Albumin/Globulin Ratio   


 


Amylase   


 


Lipase   


 


Venous Blood Potassium  3.9  


 


Salicylates   


 


Acetaminophen   


 


Alcohol, Quantitative   


 


Influenza Typ A,B (EIA)    Negative for flu a/b


 


Blood Type   A POSITIVE 


 


Blood Type Confirm   


 


Antibody Screen   Negative 


 


BBK History Checked   No verified bt 














  18





  12:37 12:55 13:07


 


WBC   


 


RBC   


 


Hgb   


 


Hct   


 


MCV   


 


MCH   


 


MCHC   


 


RDW   


 


Plt Count   


 


MPV   


 


Gran %   


 


Lymph % (Auto)   


 


Mono % (Auto)   


 


Eos % (Auto)   


 


Baso % (Auto)   


 


Gran #   


 


Lymph # (Auto)   


 


Mono # (Auto)   


 


Eos # (Auto)   


 


Baso # (Auto)   


 


Neutrophils % (Manual)   


 


Band Neutrophils %   


 


Lymphocytes % (Manual)   


 


Monocytes % (Manual)   


 


Platelet Evaluation   


 


Anisocytosis (manual)   


 


Macrocytosis (manual)   


 


PT   


 


INR   


 


APTT   


 


D-Dimer, Quantitative   


 


pO2   


 


VBG pH   


 


VBG pCO2   


 


VBG HCO3   


 


VBG Total CO2   


 


VBG O2 Sat (Calc)   


 


VBG Base Excess   


 


VBG Potassium   


 


Glucose   


 


Lactate   


 


FiO2   


 


Sodium   


 


Potassium   


 


Chloride   


 


Carbon Dioxide   


 


Anion Gap   


 


BUN   


 


Creatinine   


 


Est GFR ( Amer)   


 


Est GFR (Non-Af Amer)   


 


POC Glucose (mg/dL)  113 H  


 


Random Glucose   


 


Calcium   


 


Phosphorus   5.3 H 


 


Magnesium   


 


Total Bilirubin   


 


AST   


 


ALT   


 


Alkaline Phosphatase   


 


Lactate Dehydrogenase   


 


Total Creatine Kinase   


 


Troponin I   


 


Total Protein   


 


Albumin   


 


Globulin   


 


Albumin/Globulin Ratio   


 


Amylase   


 


Lipase   


 


Venous Blood Potassium   


 


Salicylates   


 


Acetaminophen   


 


Alcohol, Quantitative   


 


Influenza Typ A,B (EIA)   


 


Blood Type   


 


Blood Type Confirm    A POSITIVE


 


Antibody Screen   


 


BBK History Checked   














Assessment & Plan





- Assessment and Plan (Free Text)


Assessment: 





57 year old female with a PMHx of ETOH abuse (last drink 3-4 days ago), tobacco 

abuse, and HTN who presents to the ED with SOB, chest pain and abdominal pain. 

Pt found to have sepsis (with hypothermic 94.9, leukocytosis 18.8 and 

tachycardic ), Lactic acidosis 16.8, elevated LFTs (AST 57029; ALT 6554; 

T bili 4.9) and hypoglycemic with blood sugars <20. 





Neuro: 


- Stable 


- AAO x 3 





Pulm: 


- Maintain SaO2 90-94 as patient is active smoker  with possible COPD 


- 2L NC as needed 





CV: 


- Hemodynamically stable 


- Maintain map >65   





GI: 


- Elevated LFTs (AST 50959; ALT 6554; T bili 4.9; INR 5.01) consistent with 

potential liver failure 


- Discriminative factor of 228; MELD score of 34


- Cont NAC, and started on prednisolone


- GI consulted for recs 


- [facility] was called to evaluate if the patient is a candidate for liver 

transplant. Awaiting call back from hepatology fellow and possible transfer 

later today. 


- Cont to monitor and trend LFTs


- F/u surgery recs 


- GI ppx 





Renal: 


- Monitor I/O 


- Replete electrolytes as needed 





Endo: 


- Hypoglycemic to blood sugars <20


- D50 x 1 


- Q1H finger sticks 


- Currently on D5 & 1/2NS @ 100


- Maintain euglycemic 





ID: 


- Sepsis (with hypothermic 94.9, leukocytosis 18.8 and tachycardic )


- Lactic acidosis of 16.8 probably 2/2 to potential liver failure 


- Cont Zosyn 


- F/u Septic work up 





Heme


- Monitor H/H 








Pt and plan was reviewed and discussed with Dr Ruelas. 

















<Darrel Ruelas - Last Filed: 18 18:32>





Meds





- Medications


Medications: 


 Current Medications





Dextrose/Sodium Chloride (Dextrose 5%/0.45% Ns 1000 Ml)  1,000 mls @ 100 mls/hr 

IV .Q10H [facility]


   Last Admin: 18 15:32 Dose:  100 mls/hr


Piperacillin Sod/Tazobactam Sod (Zosyn 2.25 Gm In 0.9% 100 Ml)  2.25 gm in 100 

mls @ 200 mls/hr IVPB Q6 MELLISSA


   PRN Reason: Protocol


Prednisolone (Prednisolone Oral Soln)  40 mg PO DAILY MELLISSA











Results





- Vital Signs


Recent Vital Signs: 


 Last Vital Signs











Temp  96.9 F L  18 15:07


 


Pulse  95 H  18 14:07


 


Resp  19   18 14:07


 


BP  120/71   18 14:07


 


Pulse Ox  99   18 14:07














- Labs


Result Diagrams: 


 18 12:08





 18 12:08


Labs: 


 Laboratory Results - last 24 hr











  18





  15:05 15:50


 


POC Glucose (mg/dL)  77 


 


Urine Color   Dark yellow


 


Urine Appearance   Sl cloudy


 


Urine pH   5.5


 


Ur Specific Gravity   >= 1.030


 


Urine Protein   100 H


 


Urine Glucose (UA)   Negative


 


Urine Ketones   Trace H


 


Urine Blood   Small H


 


Urine Nitrate   Negative


 


Urine Bilirubin   Moderate H


 


Urine Urobilinogen   0.2


 


Ur Leukocyte Esterase   Trace H


 


Urine RBC   1 - 3


 


Urine WBC   5 - 10


 


Ur Epithelial Cells   1 - 3


 


Urine Bacteria   Mod














Assessment & Plan





- Assessment and Plan (Free Text)


Assessment: 


Patient seen and examined with resident, agree with note with following 

additions/exceptions:


Patient is 58yo female with PMhx of EtOH abuse, drinks 1-2L of vodka every 2-3 

days, for last 10 years, presents with chest pain. Pt reports chest pain is 

right sided without alleviating or aggravating factors. Pt denies fever, chills

, cough, sob, N/V/D, abd pain, recent illness, IVDU, confusion, dizziness, 

Tylenol NSAIDs abuse. No other constitutional symptoms. Reports last drink was 

, denies illicit drug use. Initially in the ER transiently altered, BG 24

, given an AMP D50, improved mentation. Currently awake, alert, oriented x 3, 

in NAD, providing full history. 


Labs with elevated LFTs in setting of INR 5, TB 4.9,  troponin and CPK 

negative. CT A/P done, noted, surgery and GI consulted. 


I spoke to Dr Trinidad, Hepatology attending at [facility], discussed case. Pt 

is likely not liver transplant candidate, given her history of recent EtOH abuse

, but would possibly benefit from transfer to their facility. I spoke to Dr Wolfe ICU fellow as well, currently there are no beds available in their ICU, 

will await transfer once bed available. MELD 34, Discriminative factor  228





Liver failure


Hypoglycemia


Severe Sepsis


Dehydration


Acutre Renal Falilure


Chest Pain


Elevated LFTs








Recommend:


- supp o2 as needed


- panculture, BCx, UCx, check Procal


- Broad Spectrum Antibiotics, Vanco, Zosyn Renally dosed


- IVF hydration D1/2NS


- FS q1hr


- check ammonia level


- RUQ Sono with doppler


- Hepatitis Panel


- NAC


- Prednisolone PO


- Lactulose 30g BID, titrate to 2-3BMs/day


- repeat CMP, INR, Lactate


- VitK IV


- q8hr Labs


- GI follow up


- surgery follow up


- monitor mental status and resp status closely


- repeat cardiac enzymes, EKG


- monitor for EtOH withdrawal


- Ativan PRN


- MVT, Thiamine, Folic Acid


- GI ppx


- DVT ppx


- monitor in MICU, awaiting transfer to [facility] ICU





Patient at high risk for morbidity and mortality





Critical care time 45 minutes

## 2018-04-12 NOTE — CT
PROCEDURE:  CT Chest, Abdomen and Pelvis without intravenous contrast



HISTORY:

chest pain/severe abdominal pain



COMPARISON:

None.



TECHNIQUE:

Radiation dose:



Total exam DLP = 436 mGy-cm.



This CT exam was performed using one or more of the following dose 

reduction techniques: Automated exposure control, adjustment of the 

mA and/or kV according to patient size, and/or use of iterative 

reconstruction technique.



FINDINGS:



CT CHEST WITHOUT CONTRAST:



LUNGS:

Clear. No nodule, mass or consolidation.



MEDIASTINUM:

Unremarkable. Normal caliber aorta and pulmonary arterial trunk. 

Normal size heart.



LYMPH NODES:

Unremarkable.



PLEURA:

Unremarkable. No pneumothorax. No pleural fluid.



BONES:

Unremarkable.



OTHER FINDINGS:

None.



CT ABDOMEN AND PELVIS:



LIVER:

Unremarkable. No gross lesion or ductal dilatation. Severe fatty 

infiltration of the liver 



GALLBLADDER AND BILE DUCTS:

The gallbladder is filled with dense sludge. 



PANCREAS:

Unremarkable. No gross lesion or ductal dilatation. There is some 

increased density in the fat planes anterior to the pancreatic body 

and head. Pancreatitis cannot be excluded and correlation with lipase 

and amylase is recommended 



SPLEEN:

Unremarkable. 



ADRENALS:

Unremarkable. No mass. 



KIDNEYS AND URETERS:

Unremarkable. No hydronephrosis. No solid mass. 



VASCULATURE:

Unremarkable. No aortic aneurysm. 



BOWEL:

There is mild mural thickening in the descending and sigmoid colon 

consistent with colitis. There is also mild diverticulosis in the 

sigmoid colon.



APPENDIX:

Normal appendix. 



PERITONEUM:

Unremarkable. No free fluid. No free air. 



LYMPH NODES:

Unremarkable. No enlarged lymph nodes. 



BLADDER:

Unremarkable. 



REPRODUCTIVE:

Unremarkable. 



BONES:

No acute fracture. 



OTHER FINDINGS:

None.



IMPRESSION:

Mild mural thickening in the descending and sigmoid colon, possible 

colitis. 



There is some stranding of the fat planes anterior to the pancreas, 

possible pancreatitis.



Severe fatty infiltration of the liver.  Dense sludge in the 

gallbladder

## 2018-04-12 NOTE — US
HISTORY:

Upper abdominal pain



COMPARISON:

None.



TECHNIQUE:

Grayscale imaging was performed.



FINDINGS:



LIVER:

Measures 17.3 cm. There is diffuse increased echogenicity of the 

liver parenchyma. No mass. No intrahepatic bile duct dilatation.



GALLBLADDER:

The gallbladder is contracted.  There is gallbladder wall thickening 

and pericholecystic fluid with hyperemia in the anterior wall.



COMMON BILE DUCT:

Measures 4.5 mm. No stones. No dilatation.



PANCREAS:

Unremarkable as visualized. No mass. No ductal dilatation.



RIGHT KIDNEY:

Measures 10.4cm. Normal echogenicity. No calculus, mass, or 

hydronephrosis.



LEFT KIDNEY:

Measures 10.7cm. Normal echogenicity. No calculus, mass, or 

hydronephrosis.



SPLEEN:

Normal in size and contour. No mass.



AORTA:

No aneurysmal dilatation. 



IVC:

Unremarkable. 



OTHER FINDINGS:

None. 



IMPRESSION:

1. Gallbladder wall thickening, pericholecystic fluid and hyperemia 

in the anterior gallbladder wall could represent acute cholecystitis 

in the appropriate clinical setting. Clinical correlation and 

follow-up is advised. 



2. Mild hepatomegaly and hepatic steatosis.

## 2018-04-12 NOTE — CARD
--------------- APPROVED REPORT --------------





EKG Measurement

Heart [id number]

TX [id number]

[id number]

[id number]

[id number]



<Conclusion>

Sinus tachycardia

Right atrial enlargement

Prolonged QTc

## 2018-04-12 NOTE — PCM.SEPTIC
Sepsis Progress Note





- Reassessment Type


Date of Evaluation: 04/12/18


Time of Evaluation: 18:35


Reassessment Type: Non-invasive reassessment





- Non Invasive Reassessment


Were the most recent vital sign reviewed: Yes


Vital Sign (Latest): 


 











Temp Pulse Resp BP Pulse Ox


 


 96.9 F L  95 H  19   120/71   99 


 


 04/12/18 15:07  04/12/18 14:07  04/12/18 14:07  04/12/18 14:07  04/12/18 14:07








Cardiovascular: Yes: Regular Rate, Rhythm


Respiratory: Yes: Normal Breath Sounds


Capillary Refill: Normal (Less than 2 sec)


Pulses: Normal Radial, Normal Dorsalis Pedis, Normal Posterior Tibialis


Skin: Normal Color

## 2018-04-13 VITALS — TEMPERATURE: 98.4 F | RESPIRATION RATE: 21 BRPM | HEART RATE: 115 BPM | OXYGEN SATURATION: 98 %

## 2018-04-13 VITALS — DIASTOLIC BLOOD PRESSURE: 79 MMHG | SYSTOLIC BLOOD PRESSURE: 147 MMHG

## 2018-04-13 LAB
ALBUMIN SERPL-MCNC: 3.5 G/DL (ref 3–4.8)
ALBUMIN/GLOB SERPL: 1.4 {RATIO} (ref 1.1–1.8)
ALT SERPL-CCNC: 4792 U/L (ref 7–56)
AST SERPL-CCNC: 6880 U/L (ref 14–36)
BASOPHILS # BLD AUTO: 0.01 K/MM3 (ref 0–2)
BASOPHILS NFR BLD: 0.1 % (ref 0–3)
BUN SERPL-MCNC: 20 MG/DL (ref 7–21)
CALCIUM SERPL-MCNC: 9.6 MG/DL (ref 8.4–10.5)
EOSINOPHIL # BLD: 0 10*3/UL (ref 0–0.7)
EOSINOPHIL NFR BLD: 0.1 % (ref 1.5–5)
ERYTHROCYTE [DISTWIDTH] IN BLOOD BY AUTOMATED COUNT: 14.1 % (ref 11.5–14.5)
GFR NON-AFRICAN AMERICAN: 39
GRANULOCYTES # BLD: 15.87 10*3/UL (ref 1.4–6.5)
GRANULOCYTES NFR BLD: 96 % (ref 50–68)
HEPATITIS A IGM: NEGATIVE
HEPATITIS A IGM: NEGATIVE
HEPATITIS B CORE AB: NEGATIVE
HEPATITIS B CORE AB: NEGATIVE
HEPATITIS C ANTIBODY: NEGATIVE
HGB BLD-MCNC: 12 G/DL (ref 12–16)
INR PPP: 3.32 (ref 0.93–1.08)
LYMPHOCYTES # BLD: 0.3 10*3/UL (ref 1.2–3.4)
LYMPHOCYTES NFR BLD AUTO: 1.9 % (ref 22–35)
MCH RBC QN AUTO: 34.6 PG (ref 25–35)
MCHC RBC AUTO-ENTMCNC: 34.4 G/DL (ref 31–37)
MCV RBC AUTO: 100.6 FL (ref 80–105)
MONOCYTES # BLD AUTO: 0.3 10*3/UL (ref 0.1–0.6)
MONOCYTES NFR BLD: 1.9 % (ref 1–6)
PLATELET # BLD: 160 10^3/UL (ref 120–450)
PMV BLD AUTO: 10.2 FL (ref 7–11)
PROTHROMBIN TIME: 39.1 SECONDS (ref 9.4–12.5)
RBC # BLD AUTO: 3.47 10^6/UL (ref 3.5–6.1)
WBC # BLD AUTO: 16.5 10^3/UL (ref 4.5–11)

## 2018-04-13 RX ADMIN — PIPERACILLIN SODIUM AND TAZOBACTAM SODIUM SCH MLS/HR: .25; 2 INJECTION, POWDER, LYOPHILIZED, FOR SOLUTION INTRAVENOUS at 11:15

## 2018-04-13 RX ADMIN — PREDNISOLONE SODIUM PHOSPHATE SCH MG: 15 SOLUTION ORAL at 09:38

## 2018-04-13 RX ADMIN — PIPERACILLIN SODIUM AND TAZOBACTAM SODIUM SCH MLS/HR: .25; 2 INJECTION, POWDER, LYOPHILIZED, FOR SOLUTION INTRAVENOUS at 00:44

## 2018-04-13 RX ADMIN — PIPERACILLIN SODIUM AND TAZOBACTAM SODIUM SCH MLS/HR: .25; 2 INJECTION, POWDER, LYOPHILIZED, FOR SOLUTION INTRAVENOUS at 05:27

## 2018-04-13 NOTE — CON
DATE:  2018



REASON FOR CONSULTATION:  Hepatic failure.



HISTORY OF PRESENT ILLNESS:  This is a 57-year-old patient with past

medical history of alcohol abuse for a long time.  Last drink she had was

about 2 days ago prior to this admission.  She was drinking about 1 to 2

liters of vodka every 3 days for nearly about 10 years and history of

smoking 1 pack per day nearly about 30 years.  She came to the emergency

room, complains of chest pain and also abdominal discomfort and left

shoulder pain.  Patient has been having the symptoms on and off past 1 to 2

weeks, recently got worse over the period of 3 days.  No history of any

difficulty in swallowing.  No history of vomiting blood.  No bleeding per

rectum or melena.  No fever.



PAST MEDICAL HISTORY:  Other past medical history is significant as above.



PAST SURGICAL HISTORY:  Significant for .



FAMILY HISTORY:  Mother had lung cancer, father colon cancer and MS.



SOCIAL HISTORY:  Positive as above.  Positive for smoking and nearly 30

beers and also alcohol use.



ALLERGIES:  NO KNOWN DRUG ALLERGIES.



REVIEW OF SYSTEMS:  Positive as above.  Other systems reviewed.



PHYSICAL EXAMINATION:

GENERAL:  Patient is lying on the bed.

VITAL SIGNS:  Temperature is 96, blood pressure 120/71, respirations 19, O2

saturation 99%.

HEENT:  Atraumatic.  Patient is jaundiced.

NECK:  Supple.

HEART:  S1 and S2 heard.

LUNGS:  Bilateral air entry present.

ABDOMEN:  Soft.  There is mild tenderness present in the epigastric area.

EXTREMITIES:  No cyanosis or clubbing.

NEUROLOGIC:  Alert, oriented, moves all the extremities.  No asterixis

present.



LABORATORY DATA:  WBC count 18.8, hemoglobin 14.3, hematocrit 42.6,

platelet 230.  LFT shows sodium 130, creatinine 1.4, BUN 22, total

bilirubin 4.7.  AST 9793, ALT 5948, alkaline phosphatase 172.  Patient did

have ultrasound scan of the abdomen and pelvis done, normal CBD, there was

some thickening of the gallbladder wall noticed.  The CT scan of the

abdomen and pelvis was also done, which showed mild thickening of the

sigmoid and descending colon, mild stranding in the pancreas noticed, fatty

infiltration of the liver noticed.  Patient's INR on admission was 5 and

repeat INR today is 8.62.



IMPRESSION:  This 57-year-old patient with long history of alcohol use, was

admitted with hepatocellular failure, admitted with significantly elevated

liver function test.  Patient has long history of alcohol use.  Patient's

INR has significantly improved.  In this patient, the sonogram did not show

any stones.  Patient's MELD score is 34 and discriminant factor 228. 

Patient has a white cell count of 18,000 questionable.  Patient has

significantly increasing INR.  I did review the ICU note.  The [facility] was consulted.  Awaiting bed for transfer.



PLAN:  I would recommend:

1.  Followup of the INR and liver function.

2.  Request for hepatitis A IgM and also hepatitis B core IgM.



I did discuss with the patient's daughter who was at bedside.  The plan is

to continue the antibiotics as per ID.  Close followup of the hemoglobin,

hematocrit, transfuse as needed.  At this point, we will await for the

cultures.  We would contemplate starting the patient on steroid.  Patient

is awaiting for the transfer to the [facility].  I did discuss

with the patient's daughter also, who was at bedside.  Patient is already

on _____.  Patient denies having taken any Tylenol or NSAID.  She has taken

2 tablets of Advil PM.  Denies taking any Tylenol.  Patient is already on

n-acetylcysteine, empiric dose.



Thank you very much for allowing us to participate in the care of the

patient.







__________________________________________

Marimar Shukla MD

D:  2018 23:21:24

DT:  2018 2:12:15

Job # [id number]

## 2018-04-13 NOTE — CP.CCUPN
<Braydon Mix - Last Filed: 04/13/18 10:21>





CCU Subjective





- Physician Review


Subjective (Free Text): 





Critical care progress note: 





Pt seen and examined at bedside. No acute events overnight. Patient complains 

of cough but denies any sob or chest pain. Denies any other complaints. 





12 Point ROS performed and neg other than stated above. 


Critical Care Time Spent (in minutes): 45





CCU Objective





- Vital Signs / Intake & Output


Vital Signs (Last 4 hours): 


Vital Signs











  Pulse Resp BP Pulse Ox


 


 04/13/18 10:10  130 H  16   97


 


 04/13/18 10:09  132 H  21  


 


 04/13/18 10:08  128 H  30 H  


 


 04/13/18 10:07  126 H  17  


 


 04/13/18 10:06  125 H  20  


 


 04/13/18 10:00  112 H  23  142/73  97


 


 04/13/18 09:50  97 H  27 H  085/94 H  97


 


 04/13/18 09:40  85  24   97


 


 04/13/18 09:38  98 H  27 H  185/94 H  82 L


 


 04/13/18 09:30  82  14   94 L


 


 04/13/18 09:20  85  17   96


 


 04/13/18 09:18  94 H  28 H  


 


 04/13/18 09:10  85  15   96


 


 04/13/18 09:01  91 H  17  176/105 H  80 L


 


 04/13/18 09:00  94 H  22   93 L


 


 04/13/18 08:50  93 H  30 H   96


 


 04/13/18 08:40  107 H  18   98


 


 04/13/18 08:30  79  16   97


 


 04/13/18 08:20  84  16   96


 


 04/13/18 08:10  84  17   96


 


 04/13/18 08:01  83  22  158/85 H  97


 


 04/13/18 08:00  81  13   97


 


 04/13/18 07:50  82  16   96


 


 04/13/18 07:45  86  14  163/128 H  93 L


 


 04/13/18 07:40  86  29 H   97


 


 04/13/18 07:30  107 H  24   98


 


 04/13/18 07:20  84  12   97


 


 04/13/18 07:10  83  19   97


 


 04/13/18 07:00  84  16  168/93 H  97


 


 04/13/18 06:50  79  17   97


 


 04/13/18 06:40  80  15   96


 


 04/13/18 06:30  80  16   97











Intake and Output (Last 8hrs): 


 Intake & Output











 04/12/18 04/13/18 04/13/18





 22:59 06:59 14:59


 


Intake Total 710 2188 


 


Output Total 0 4 


 


Balance 710 2184 


 


Weight 135 lb 135 lb 


 


Intake:   


 


   2188 


 


    Right Antecubital 650  


 


    abx  200 


 


    acetadote  1000 


 


    d5 0.9  400 


 


    ffp  588 


 


  Oral 60  


 


Output:   


 


  Urine 0  


 


    Urine, Voided 0  


 


  Urine/Stool Mix  4 


 


Other:   


 


  # Voids   


 


    Urine, Voided  4 














- Physical Exam


Head: Positive for: Atraumatic, Normocephalic


Pupils: Positive for: PERRL


Extroacular Muscles: Positive for: EOMI


Conjunctiva: Positive for: Icteric


Mouth: Positive for: Dry


Pharnyx: Negative for: ERYTHEMA, Strider


Nose (Internal): Positive for: Normal Inspection


Neck: Positive for: Normal Range of Motion.  Negative for: Meningeal Signs, 

MIDLINE TENDERNESS


Respiratory/Chest: Positive for: Clear to Auscultation.  Negative for: Wheezes, 

Rales, Rhonchi


Cardiovascular: Positive for: Murmurs, Tachycardic


Abdomen: Positive for: Tenderness, Distention, Other (diffuse tenderness, no 

pulsatile masses, no incarcerated hernia).  Negative for: Rebound, Guarding


Rectal: Negative for: Gross Blood


Upper Extremity: Positive for: NORMAL PULSES, Other (pain on palpation of right 

upper shoulder, worse with ROM).  Negative for: Edema


Lower Extremity: Positive for: NORMAL PULSES, Neurovascularly Intact.  Negative 

for: Edema


Neurological: Positive for: Motor Func Grossly Intact, Normal Sensory Function


Skin: Positive for: Pale


Psychiatric: Positive for: Alert, Normal Insight, Normal Concentration





- Medications


Active Medications: 


Active Medications











Generic Name Dose Route Start Last Admin





  Trade Name Freq  PRN Reason Stop Dose Admin


 


Hydralazine HCl  10 mg  04/13/18 09:40  04/13/18 09:50





  Apresoline  IVP   10 mg





  Q6 PRN   Administration





  Other   


 


Piperacillin Sod/Tazobactam Sod  2.25 gm in 100 mls @ 200 mls/hr  04/12/18 18:

00  04/13/18 05:27





  Zosyn 2.25 Gm In 0.9% 100 Ml  IVPB   200 mls/hr





  Q6 MELLISSA   Administration





  Protocol   


 


Acetylcysteine 6,120 mg/  1,030.6 mls @ 62.5 mls/hr  04/12/18 23:00  04/12/18 23

:26





  Dextrose  IVPB  04/13/18 15:29  62.5 mls/hr





  ONCE ONE   Administration


 


Dextrose/Sodium Chloride  1,000 mls @ 75 mls/hr  04/13/18 09:42  04/13/18 09:42





  Dextrose 5%/0.9% Ns 1000 Ml  IV   75 mls/hr





  [id number] MELLISSA   Administration


 


Lactulose  30 gm  04/12/18 18:45  04/13/18 09:30





  Enulose  PO   30 gm





  BID MELLISSA   Administration


 


Prednisolone  40 mg  04/12/18 17:30  04/13/18 09:38





  Prednisolone Oral Soln  PO   40 mg





  DAILY MELLISSA   Administration














- Patient Studies


Lab Studies: 


 Lab Studies











  04/13/18 04/13/18 04/13/18 Range/Units





  08:00 06:00 06:00 


 


WBC    16.5 H  (4.5-11.0)  10^3/ul


 


RBC    3.47 L  (3.5-6.1)  10^6/uL


 


Hgb    12.0  D  (12.0-16.0)  g/dL


 


Hct    34.9 L  (36.0-48.0)  %


 


MCV    100.6  (80.0-105.0)  fl


 


MCH    34.6  (25.0-35.0)  pg


 


MCHC    34.4  (31.0-37.0)  g/dl


 


RDW    14.1  (11.5-14.5)  %


 


Plt Count    160  (120.0-450.0)  10^3/uL


 


MPV    10.2  (7.0-11.0)  fl


 


Gran %    96.0 H  (50.0-68.0)  %


 


Lymph % (Auto)    1.9 L  (22.0-35.0)  %


 


Mono % (Auto)    1.9  (1.0-6.0)  %


 


Eos % (Auto)    0.1 L  (1.5-5.0)  %


 


Baso % (Auto)    0.1  (0.0-3.0)  %


 


Gran #    15.87 H  (1.4-6.5)  


 


Lymph # (Auto)    0.3 L  (1.2-3.4)  


 


Mono # (Auto)    0.3  (0.1-0.6)  


 


Eos # (Auto)    0.0  (0.0-0.7)  


 


Baso # (Auto)    0.01  (0.0-2.0)  K/mm3


 


PT   39.1 H   (9.4-12.5)  SECONDS


 


INR   3.32 H   (0.93-1.08)  


 


pO2     (30-55)  mm/Hg


 


VBG pH     (7.32-7.43)  


 


VBG pCO2     (40-60)  


 


VBG HCO3     (21-28)  mmol/l


 


VBG Total CO2     (22-28)  mmol.L


 


VBG O2 Sat (Calc)     (40-65)  %


 


VBG Base Excess     (0.0-2.0)  mmol/L


 


VBG Potassium     (3.6-5.2)  mmol/L


 


Sodium     (132-148)  mmol/L


 


Chloride     ()  mmol/L


 


Glucose     ()  mg/dl


 


Lactate     (0.7-2.1)  mmol/L


 


FiO2     %


 


Potassium     (3.6-5.0)  mmol/L


 


Carbon Dioxide     (21-33)  mmol/L


 


Anion Gap     (10-20)  


 


BUN     (7-21)  mg/dL


 


Creatinine     (0.7-1.2)  mg/dl


 


Est GFR (African Amer)     


 


Est GFR (Non-Af Amer)     


 


POC Glucose (mg/dL)  153 H    ()  mg/dL


 


Random Glucose     ()  mg/dL


 


Calcium     (8.4-10.5)  mg/dL


 


Total Bilirubin     (0.2-1.3)  mg/dL


 


AST     (14-36)  U/L


 


ALT     (7-56)  U/L


 


Alkaline Phosphatase     ()  U/L


 


Ammonia     (9-33)  umol/L


 


Total Protein     (5.8-8.3)  g/dL


 


Albumin     (3.0-4.8)  g/dL


 


Globulin     gm/dL


 


Albumin/Globulin Ratio     (1.1-1.8)  


 


Venous Blood Potassium     (3.6-5.2)  mmol/L


 


Urine Color     (YELLOW)  


 


Urine Appearance     (CLEAR)  


 


Urine pH     (4.7-8.0)  


 


Ur Specific Gravity     (1.005-1.035)  


 


Urine Protein     (<30 mg/dL)  mg/dL


 


Urine Glucose (UA)     (NEGATIVE)  mg/dL


 


Urine Ketones     (NEGATIVE)  mg/dL


 


Urine Blood     (NEGATIVE)  


 


Urine Nitrate     (NEGATIVE)  


 


Urine Bilirubin     (NEGATIVE)  


 


Urine Urobilinogen     (<1 E.U./dL)  E.U./dL


 


Ur Leukocyte Esterase     (NEGATIVE)  Rigoberto/uL


 


Urine RBC     (0-2)  /hpf


 


Urine WBC     (0-6)  /hpf


 


Ur Epithelial Cells     (0-5)  /hpf


 


Urine Bacteria     (NEG)  














  04/13/18 04/13/18 04/13/18 Range/Units





  06:00 05:56 03:57 


 


WBC     (4.5-11.0)  10^3/ul


 


RBC     (3.5-6.1)  10^6/uL


 


Hgb     (12.0-16.0)  g/dL


 


Hct     (36.0-48.0)  %


 


MCV     (80.0-105.0)  fl


 


MCH     (25.0-35.0)  pg


 


MCHC     (31.0-37.0)  g/dl


 


RDW     (11.5-14.5)  %


 


Plt Count     (120.0-450.0)  10^3/uL


 


MPV     (7.0-11.0)  fl


 


Gran %     (50.0-68.0)  %


 


Lymph % (Auto)     (22.0-35.0)  %


 


Mono % (Auto)     (1.0-6.0)  %


 


Eos % (Auto)     (1.5-5.0)  %


 


Baso % (Auto)     (0.0-3.0)  %


 


Gran #     (1.4-6.5)  


 


Lymph # (Auto)     (1.2-3.4)  


 


Mono # (Auto)     (0.1-0.6)  


 


Eos # (Auto)     (0.0-0.7)  


 


Baso # (Auto)     (0.0-2.0)  K/mm3


 


PT     (9.4-12.5)  SECONDS


 


INR     (0.93-1.08)  


 


pO2     (30-55)  mm/Hg


 


VBG pH     (7.32-7.43)  


 


VBG pCO2     (40-60)  


 


VBG HCO3     (21-28)  mmol/l


 


VBG Total CO2     (22-28)  mmol.L


 


VBG O2 Sat (Calc)     (40-65)  %


 


VBG Base Excess     (0.0-2.0)  mmol/L


 


VBG Potassium     (3.6-5.2)  mmol/L


 


Sodium  132    (132-148)  mmol/L


 


Chloride  91 L    ()  mmol/L


 


Glucose     ()  mg/dl


 


Lactate     (0.7-2.1)  mmol/L


 


FiO2     %


 


Potassium  3.5 L    (3.6-5.0)  mmol/L


 


Carbon Dioxide  30    (21-33)  mmol/L


 


Anion Gap  15    (10-20)  


 


BUN  20    (7-21)  mg/dL


 


Creatinine  1.4 H    (0.7-1.2)  mg/dl


 


Est GFR ( Amer)  47    


 


Est GFR (Non-Af Amer)  39    


 


POC Glucose (mg/dL)   133 H  132 H  ()  mg/dL


 


Random Glucose  134 H    ()  mg/dL


 


Calcium  9.6    (8.4-10.5)  mg/dL


 


Total Bilirubin  4.6 H    (0.2-1.3)  mg/dL


 


AST  6880 H    (14-36)  U/L


 


ALT  4792 H    (7-56)  U/L


 


Alkaline Phosphatase  118    ()  U/L


 


Ammonia     (9-33)  umol/L


 


Total Protein  5.9    (5.8-8.3)  g/dL


 


Albumin  3.5    (3.0-4.8)  g/dL


 


Globulin  2.5    gm/dL


 


Albumin/Globulin Ratio  1.4    (1.1-1.8)  


 


Venous Blood Potassium     (3.6-5.2)  mmol/L


 


Urine Color     (YELLOW)  


 


Urine Appearance     (CLEAR)  


 


Urine pH     (4.7-8.0)  


 


Ur Specific Gravity     (1.005-1.035)  


 


Urine Protein     (<30 mg/dL)  mg/dL


 


Urine Glucose (UA)     (NEGATIVE)  mg/dL


 


Urine Ketones     (NEGATIVE)  mg/dL


 


Urine Blood     (NEGATIVE)  


 


Urine Nitrate     (NEGATIVE)  


 


Urine Bilirubin     (NEGATIVE)  


 


Urine Urobilinogen     (<1 E.U./dL)  E.U./dL


 


Ur Leukocyte Esterase     (NEGATIVE)  Rigoberto/uL


 


Urine RBC     (0-2)  /hpf


 


Urine WBC     (0-6)  /hpf


 


Ur Epithelial Cells     (0-5)  /hpf


 


Urine Bacteria     (NEG)  














  04/13/18 04/13/18 04/12/18 Range/Units





  01:55 00:07 22:43 


 


WBC     (4.5-11.0)  10^3/ul


 


RBC     (3.5-6.1)  10^6/uL


 


Hgb     (12.0-16.0)  g/dL


 


Hct     (36.0-48.0)  %


 


MCV     (80.0-105.0)  fl


 


MCH     (25.0-35.0)  pg


 


MCHC     (31.0-37.0)  g/dl


 


RDW     (11.5-14.5)  %


 


Plt Count     (120.0-450.0)  10^3/uL


 


MPV     (7.0-11.0)  fl


 


Gran %     (50.0-68.0)  %


 


Lymph % (Auto)     (22.0-35.0)  %


 


Mono % (Auto)     (1.0-6.0)  %


 


Eos % (Auto)     (1.5-5.0)  %


 


Baso % (Auto)     (0.0-3.0)  %


 


Gran #     (1.4-6.5)  


 


Lymph # (Auto)     (1.2-3.4)  


 


Mono # (Auto)     (0.1-0.6)  


 


Eos # (Auto)     (0.0-0.7)  


 


Baso # (Auto)     (0.0-2.0)  K/mm3


 


PT     (9.4-12.5)  SECONDS


 


INR     (0.93-1.08)  


 


pO2     (30-55)  mm/Hg


 


VBG pH     (7.32-7.43)  


 


VBG pCO2     (40-60)  


 


VBG HCO3     (21-28)  mmol/l


 


VBG Total CO2     (22-28)  mmol.L


 


VBG O2 Sat (Calc)     (40-65)  %


 


VBG Base Excess     (0.0-2.0)  mmol/L


 


VBG Potassium     (3.6-5.2)  mmol/L


 


Sodium     (132-148)  mmol/L


 


Chloride     ()  mmol/L


 


Glucose     ()  mg/dl


 


Lactate     (0.7-2.1)  mmol/L


 


FiO2     %


 


Potassium     (3.6-5.0)  mmol/L


 


Carbon Dioxide     (21-33)  mmol/L


 


Anion Gap     (10-20)  


 


BUN     (7-21)  mg/dL


 


Creatinine     (0.7-1.2)  mg/dl


 


Est GFR (African Amer)     


 


Est GFR (Non-Af Amer)     


 


POC Glucose (mg/dL)  132 H  166 H  154 H  ()  mg/dL


 


Random Glucose     ()  mg/dL


 


Calcium     (8.4-10.5)  mg/dL


 


Total Bilirubin     (0.2-1.3)  mg/dL


 


AST     (14-36)  U/L


 


ALT     (7-56)  U/L


 


Alkaline Phosphatase     ()  U/L


 


Ammonia     (9-33)  umol/L


 


Total Protein     (5.8-8.3)  g/dL


 


Albumin     (3.0-4.8)  g/dL


 


Globulin     gm/dL


 


Albumin/Globulin Ratio     (1.1-1.8)  


 


Venous Blood Potassium     (3.6-5.2)  mmol/L


 


Urine Color     (YELLOW)  


 


Urine Appearance     (CLEAR)  


 


Urine pH     (4.7-8.0)  


 


Ur Specific Gravity     (1.005-1.035)  


 


Urine Protein     (<30 mg/dL)  mg/dL


 


Urine Glucose (UA)     (NEGATIVE)  mg/dL


 


Urine Ketones     (NEGATIVE)  mg/dL


 


Urine Blood     (NEGATIVE)  


 


Urine Nitrate     (NEGATIVE)  


 


Urine Bilirubin     (NEGATIVE)  


 


Urine Urobilinogen     (<1 E.U./dL)  E.U./dL


 


Ur Leukocyte Esterase     (NEGATIVE)  Rigoberto/uL


 


Urine RBC     (0-2)  /hpf


 


Urine WBC     (0-6)  /hpf


 


Ur Epithelial Cells     (0-5)  /hpf


 


Urine Bacteria     (NEG)  














  04/12/18 04/12/18 04/12/18 Range/Units





  20:47 20:00 20:00 


 


WBC     (4.5-11.0)  10^3/ul


 


RBC     (3.5-6.1)  10^6/uL


 


Hgb     (12.0-16.0)  g/dL


 


Hct     (36.0-48.0)  %


 


MCV     (80.0-105.0)  fl


 


MCH     (25.0-35.0)  pg


 


MCHC     (31.0-37.0)  g/dl


 


RDW     (11.5-14.5)  %


 


Plt Count     (120.0-450.0)  10^3/uL


 


MPV     (7.0-11.0)  fl


 


Gran %     (50.0-68.0)  %


 


Lymph % (Auto)     (22.0-35.0)  %


 


Mono % (Auto)     (1.0-6.0)  %


 


Eos % (Auto)     (1.5-5.0)  %


 


Baso % (Auto)     (0.0-3.0)  %


 


Gran #     (1.4-6.5)  


 


Lymph # (Auto)     (1.2-3.4)  


 


Mono # (Auto)     (0.1-0.6)  


 


Eos # (Auto)     (0.0-0.7)  


 


Baso # (Auto)     (0.0-2.0)  K/mm3


 


PT     (9.4-12.5)  SECONDS


 


INR     (0.93-1.08)  


 


pO2    71 H  (30-55)  mm/Hg


 


VBG pH    7.46 H  (7.32-7.43)  


 


VBG pCO2    32.0 L  (40-60)  


 


VBG HCO3    22.8  (21-28)  mmol/l


 


VBG Total CO2    23.8  (22-28)  mmol.L


 


VBG O2 Sat (Calc)    97.7 H  (40-65)  %


 


VBG Base Excess    -0.3 L  (0.0-2.0)  mmol/L


 


VBG Potassium    5.0  (3.6-5.2)  mmol/L


 


Sodium   130 L  130.0 L  (132-148)  mmol/L


 


Chloride   90 L  91.0 L  ()  mmol/L


 


Glucose    124 H  ()  mg/dl


 


Lactate    7.9 H*  (0.7-2.1)  mmol/L


 


FiO2    21.0  %


 


Potassium   4.8   (3.6-5.0)  mmol/L


 


Carbon Dioxide   22   (21-33)  mmol/L


 


Anion Gap   22 H   (10-20)  


 


BUN   22 H   (7-21)  mg/dL


 


Creatinine   1.4 H   (0.7-1.2)  mg/dl


 


Est GFR ( Amer)   47   


 


Est GFR (Non-Af Amer)   39   


 


POC Glucose (mg/dL)  98    ()  mg/dL


 


Random Glucose   111 H   ()  mg/dL


 


Calcium   9.8   (8.4-10.5)  mg/dL


 


Total Bilirubin   4.7 H   (0.2-1.3)  mg/dL


 


AST   9793 H   (14-36)  U/L


 


ALT   5948 H   (7-56)  U/L


 


Alkaline Phosphatase   72   ()  U/L


 


Ammonia     (9-33)  umol/L


 


Total Protein   6.0   (5.8-8.3)  g/dL


 


Albumin   3.5   (3.0-4.8)  g/dL


 


Globulin   2.4   gm/dL


 


Albumin/Globulin Ratio   1.5   (1.1-1.8)  


 


Venous Blood Potassium    5.0  (3.6-5.2)  mmol/L


 


Urine Color     (YELLOW)  


 


Urine Appearance     (CLEAR)  


 


Urine pH     (4.7-8.0)  


 


Ur Specific Gravity     (1.005-1.035)  


 


Urine Protein     (<30 mg/dL)  mg/dL


 


Urine Glucose (UA)     (NEGATIVE)  mg/dL


 


Urine Ketones     (NEGATIVE)  mg/dL


 


Urine Blood     (NEGATIVE)  


 


Urine Nitrate     (NEGATIVE)  


 


Urine Bilirubin     (NEGATIVE)  


 


Urine Urobilinogen     (<1 E.U./dL)  E.U./dL


 


Ur Leukocyte Esterase     (NEGATIVE)  Rigoberto/uL


 


Urine RBC     (0-2)  /hpf


 


Urine WBC     (0-6)  /hpf


 


Ur Epithelial Cells     (0-5)  /hpf


 


Urine Bacteria     (NEG)  














  04/12/18 04/12/18 04/12/18 Range/Units





  20:00 18:00 18:00 


 


WBC     (4.5-11.0)  10^3/ul


 


RBC     (3.5-6.1)  10^6/uL


 


Hgb     (12.0-16.0)  g/dL


 


Hct     (36.0-48.0)  %


 


MCV     (80.0-105.0)  fl


 


MCH     (25.0-35.0)  pg


 


MCHC     (31.0-37.0)  g/dl


 


RDW     (11.5-14.5)  %


 


Plt Count     (120.0-450.0)  10^3/uL


 


MPV     (7.0-11.0)  fl


 


Gran %     (50.0-68.0)  %


 


Lymph % (Auto)     (22.0-35.0)  %


 


Mono % (Auto)     (1.0-6.0)  %


 


Eos % (Auto)     (1.5-5.0)  %


 


Baso % (Auto)     (0.0-3.0)  %


 


Gran #     (1.4-6.5)  


 


Lymph # (Auto)     (1.2-3.4)  


 


Mono # (Auto)     (0.1-0.6)  


 


Eos # (Auto)     (0.0-0.7)  


 


Baso # (Auto)     (0.0-2.0)  K/mm3


 


PT  102.7 H    (9.4-12.5)  SECONDS


 


INR  8.62 H*    (0.93-1.08)  


 


pO2    41  (30-55)  mm/Hg


 


VBG pH    7.43  (7.32-7.43)  


 


VBG pCO2    33.0 L  (40-60)  


 


VBG HCO3    21.9  (21-28)  mmol/l


 


VBG Total CO2    22.9  (22-28)  mmol.L


 


VBG O2 Sat (Calc)    81.4 H  (40-65)  %


 


VBG Base Excess    -1.7 L  (0.0-2.0)  mmol/L


 


VBG Potassium    5.8 H  (3.6-5.2)  mmol/L


 


Sodium    131.0 L  (132-148)  mmol/L


 


Chloride    91.0 L  ()  mmol/L


 


Glucose    108 H  ()  mg/dl


 


Lactate    10.5 H*  (0.7-2.1)  mmol/L


 


FiO2    21.0  %


 


Potassium     (3.6-5.0)  mmol/L


 


Carbon Dioxide     (21-33)  mmol/L


 


Anion Gap     (10-20)  


 


BUN     (7-21)  mg/dL


 


Creatinine     (0.7-1.2)  mg/dl


 


Est GFR (African Amer)     


 


Est GFR (Non-Af Amer)     


 


POC Glucose (mg/dL)     ()  mg/dL


 


Random Glucose     ()  mg/dL


 


Calcium     (8.4-10.5)  mg/dL


 


Total Bilirubin     (0.2-1.3)  mg/dL


 


AST     (14-36)  U/L


 


ALT     (7-56)  U/L


 


Alkaline Phosphatase     ()  U/L


 


Ammonia   25   (9-33)  umol/L


 


Total Protein     (5.8-8.3)  g/dL


 


Albumin     (3.0-4.8)  g/dL


 


Globulin     gm/dL


 


Albumin/Globulin Ratio     (1.1-1.8)  


 


Venous Blood Potassium    5.8 H  (3.6-5.2)  mmol/L


 


Urine Color     (YELLOW)  


 


Urine Appearance     (CLEAR)  


 


Urine pH     (4.7-8.0)  


 


Ur Specific Gravity     (1.005-1.035)  


 


Urine Protein     (<30 mg/dL)  mg/dL


 


Urine Glucose (UA)     (NEGATIVE)  mg/dL


 


Urine Ketones     (NEGATIVE)  mg/dL


 


Urine Blood     (NEGATIVE)  


 


Urine Nitrate     (NEGATIVE)  


 


Urine Bilirubin     (NEGATIVE)  


 


Urine Urobilinogen     (<1 E.U./dL)  E.U./dL


 


Ur Leukocyte Esterase     (NEGATIVE)  Rigoberto/uL


 


Urine RBC     (0-2)  /hpf


 


Urine WBC     (0-6)  /hpf


 


Ur Epithelial Cells     (0-5)  /hpf


 


Urine Bacteria     (NEG)  














  04/12/18 04/12/18 Range/Units





  15:50 15:05 


 


WBC    (4.5-11.0)  10^3/ul


 


RBC    (3.5-6.1)  10^6/uL


 


Hgb    (12.0-16.0)  g/dL


 


Hct    (36.0-48.0)  %


 


MCV    (80.0-105.0)  fl


 


MCH    (25.0-35.0)  pg


 


MCHC    (31.0-37.0)  g/dl


 


RDW    (11.5-14.5)  %


 


Plt Count    (120.0-450.0)  10^3/uL


 


MPV    (7.0-11.0)  fl


 


Gran %    (50.0-68.0)  %


 


Lymph % (Auto)    (22.0-35.0)  %


 


Mono % (Auto)    (1.0-6.0)  %


 


Eos % (Auto)    (1.5-5.0)  %


 


Baso % (Auto)    (0.0-3.0)  %


 


Gran #    (1.4-6.5)  


 


Lymph # (Auto)    (1.2-3.4)  


 


Mono # (Auto)    (0.1-0.6)  


 


Eos # (Auto)    (0.0-0.7)  


 


Baso # (Auto)    (0.0-2.0)  K/mm3


 


PT    (9.4-12.5)  SECONDS


 


INR    (0.93-1.08)  


 


pO2    (30-55)  mm/Hg


 


VBG pH    (7.32-7.43)  


 


VBG pCO2    (40-60)  


 


VBG HCO3    (21-28)  mmol/l


 


VBG Total CO2    (22-28)  mmol.L


 


VBG O2 Sat (Calc)    (40-65)  %


 


VBG Base Excess    (0.0-2.0)  mmol/L


 


VBG Potassium    (3.6-5.2)  mmol/L


 


Sodium    (132-148)  mmol/L


 


Chloride    ()  mmol/L


 


Glucose    ()  mg/dl


 


Lactate    (0.7-2.1)  mmol/L


 


FiO2    %


 


Potassium    (3.6-5.0)  mmol/L


 


Carbon Dioxide    (21-33)  mmol/L


 


Anion Gap    (10-20)  


 


BUN    (7-21)  mg/dL


 


Creatinine    (0.7-1.2)  mg/dl


 


Est GFR (African Amer)    


 


Est GFR (Non-Af Amer)    


 


POC Glucose (mg/dL)   77  ()  mg/dL


 


Random Glucose    ()  mg/dL


 


Calcium    (8.4-10.5)  mg/dL


 


Total Bilirubin    (0.2-1.3)  mg/dL


 


AST    (14-36)  U/L


 


ALT    (7-56)  U/L


 


Alkaline Phosphatase    ()  U/L


 


Ammonia    (9-33)  umol/L


 


Total Protein    (5.8-8.3)  g/dL


 


Albumin    (3.0-4.8)  g/dL


 


Globulin    gm/dL


 


Albumin/Globulin Ratio    (1.1-1.8)  


 


Venous Blood Potassium    (3.6-5.2)  mmol/L


 


Urine Color  Dark yellow   (YELLOW)  


 


Urine Appearance  Sl cloudy   (CLEAR)  


 


Urine pH  5.5   (4.7-8.0)  


 


Ur Specific Gravity  >= 1.030   (1.005-1.035)  


 


Urine Protein  100 H   (<30 mg/dL)  mg/dL


 


Urine Glucose (UA)  Negative   (NEGATIVE)  mg/dL


 


Urine Ketones  Trace H   (NEGATIVE)  mg/dL


 


Urine Blood  Small H   (NEGATIVE)  


 


Urine Nitrate  Negative   (NEGATIVE)  


 


Urine Bilirubin  Moderate H   (NEGATIVE)  


 


Urine Urobilinogen  0.2   (<1 E.U./dL)  E.U./dL


 


Ur Leukocyte Esterase  Trace H   (NEGATIVE)  Rigoberto/uL


 


Urine RBC  1 - 3   (0-2)  /hpf


 


Urine WBC  5 - 10   (0-6)  /hpf


 


Ur Epithelial Cells  1 - 3   (0-5)  /hpf


 


Urine Bacteria  Mod   (NEG)  








 Laboratory Results - last 24 hr











  04/12/18 04/12/18 04/12/18





  15:05 15:50 18:00


 


WBC   


 


RBC   


 


Hgb   


 


Hct   


 


MCV   


 


MCH   


 


MCHC   


 


RDW   


 


Plt Count   


 


MPV   


 


Gran %   


 


Lymph % (Auto)   


 


Mono % (Auto)   


 


Eos % (Auto)   


 


Baso % (Auto)   


 


Gran #   


 


Lymph # (Auto)   


 


Mono # (Auto)   


 


Eos # (Auto)   


 


Baso # (Auto)   


 


PT   


 


INR   


 


pO2    41


 


VBG pH    7.43


 


VBG pCO2    33.0 L


 


VBG HCO3    21.9


 


VBG Total CO2    22.9


 


VBG O2 Sat (Calc)    81.4 H


 


VBG Base Excess    -1.7 L


 


VBG Potassium    5.8 H


 


Sodium    131.0 L


 


Chloride    91.0 L


 


Glucose    108 H


 


Lactate    10.5 H*


 


FiO2    21.0


 


Potassium   


 


Carbon Dioxide   


 


Anion Gap   


 


BUN   


 


Creatinine   


 


Est GFR ( Amer)   


 


Est GFR (Non-Af Amer)   


 


POC Glucose (mg/dL)  77  


 


Random Glucose   


 


Calcium   


 


Total Bilirubin   


 


AST   


 


ALT   


 


Alkaline Phosphatase   


 


Ammonia   


 


Total Protein   


 


Albumin   


 


Globulin   


 


Albumin/Globulin Ratio   


 


Venous Blood Potassium    5.8 H


 


Urine Color   Dark yellow 


 


Urine Appearance   Sl cloudy 


 


Urine pH   5.5 


 


Ur Specific Gravity   >= 1.030 


 


Urine Protein   100 H 


 


Urine Glucose (UA)   Negative 


 


Urine Ketones   Trace H 


 


Urine Blood   Small H 


 


Urine Nitrate   Negative 


 


Urine Bilirubin   Moderate H 


 


Urine Urobilinogen   0.2 


 


Ur Leukocyte Esterase   Trace H 


 


Urine RBC   1 - 3 


 


Urine WBC   5 - 10 


 


Ur Epithelial Cells   1 - 3 


 


Urine Bacteria   Mod 














  04/12/18 04/12/18 04/12/18





  18:00 20:00 20:00


 


WBC   


 


RBC   


 


Hgb   


 


Hct   


 


MCV   


 


MCH   


 


MCHC   


 


RDW   


 


Plt Count   


 


MPV   


 


Gran %   


 


Lymph % (Auto)   


 


Mono % (Auto)   


 


Eos % (Auto)   


 


Baso % (Auto)   


 


Gran #   


 


Lymph # (Auto)   


 


Mono # (Auto)   


 


Eos # (Auto)   


 


Baso # (Auto)   


 


PT   102.7 H 


 


INR   8.62 H* 


 


pO2    71 H


 


VBG pH    7.46 H


 


VBG pCO2    32.0 L


 


VBG HCO3    22.8


 


VBG Total CO2    23.8


 


VBG O2 Sat (Calc)    97.7 H


 


VBG Base Excess    -0.3 L


 


VBG Potassium    5.0


 


Sodium    130.0 L


 


Chloride    91.0 L


 


Glucose    124 H


 


Lactate    7.9 H*


 


FiO2    21.0


 


Potassium   


 


Carbon Dioxide   


 


Anion Gap   


 


BUN   


 


Creatinine   


 


Est GFR ( Amer)   


 


Est GFR (Non-Af Amer)   


 


POC Glucose (mg/dL)   


 


Random Glucose   


 


Calcium   


 


Total Bilirubin   


 


AST   


 


ALT   


 


Alkaline Phosphatase   


 


Ammonia  25  


 


Total Protein   


 


Albumin   


 


Globulin   


 


Albumin/Globulin Ratio   


 


Venous Blood Potassium    5.0


 


Urine Color   


 


Urine Appearance   


 


Urine pH   


 


Ur Specific Gravity   


 


Urine Protein   


 


Urine Glucose (UA)   


 


Urine Ketones   


 


Urine Blood   


 


Urine Nitrate   


 


Urine Bilirubin   


 


Urine Urobilinogen   


 


Ur Leukocyte Esterase   


 


Urine RBC   


 


Urine WBC   


 


Ur Epithelial Cells   


 


Urine Bacteria   














  04/12/18 04/12/18 04/12/18





  20:00 20:47 22:43


 


WBC   


 


RBC   


 


Hgb   


 


Hct   


 


MCV   


 


MCH   


 


MCHC   


 


RDW   


 


Plt Count   


 


MPV   


 


Gran %   


 


Lymph % (Auto)   


 


Mono % (Auto)   


 


Eos % (Auto)   


 


Baso % (Auto)   


 


Gran #   


 


Lymph # (Auto)   


 


Mono # (Auto)   


 


Eos # (Auto)   


 


Baso # (Auto)   


 


PT   


 


INR   


 


pO2   


 


VBG pH   


 


VBG pCO2   


 


VBG HCO3   


 


VBG Total CO2   


 


VBG O2 Sat (Calc)   


 


VBG Base Excess   


 


VBG Potassium   


 


Sodium  130 L  


 


Chloride  90 L  


 


Glucose   


 


Lactate   


 


FiO2   


 


Potassium  4.8  


 


Carbon Dioxide  22  


 


Anion Gap  22 H  


 


BUN  22 H  


 


Creatinine  1.4 H  


 


Est GFR ( Amer)  47  


 


Est GFR (Non-Af Amer)  39  


 


POC Glucose (mg/dL)   98  154 H


 


Random Glucose  111 H  


 


Calcium  9.8  


 


Total Bilirubin  4.7 H  


 


AST  9793 H  


 


ALT  5948 H  


 


Alkaline Phosphatase  72  


 


Ammonia   


 


Total Protein  6.0  


 


Albumin  3.5  


 


Globulin  2.4  


 


Albumin/Globulin Ratio  1.5  


 


Venous Blood Potassium   


 


Urine Color   


 


Urine Appearance   


 


Urine pH   


 


Ur Specific Gravity   


 


Urine Protein   


 


Urine Glucose (UA)   


 


Urine Ketones   


 


Urine Blood   


 


Urine Nitrate   


 


Urine Bilirubin   


 


Urine Urobilinogen   


 


Ur Leukocyte Esterase   


 


Urine RBC   


 


Urine WBC   


 


Ur Epithelial Cells   


 


Urine Bacteria   














  04/13/18 04/13/18 04/13/18





  00:07 01:55 03:57


 


WBC   


 


RBC   


 


Hgb   


 


Hct   


 


MCV   


 


MCH   


 


MCHC   


 


RDW   


 


Plt Count   


 


MPV   


 


Gran %   


 


Lymph % (Auto)   


 


Mono % (Auto)   


 


Eos % (Auto)   


 


Baso % (Auto)   


 


Gran #   


 


Lymph # (Auto)   


 


Mono # (Auto)   


 


Eos # (Auto)   


 


Baso # (Auto)   


 


PT   


 


INR   


 


pO2   


 


VBG pH   


 


VBG pCO2   


 


VBG HCO3   


 


VBG Total CO2   


 


VBG O2 Sat (Calc)   


 


VBG Base Excess   


 


VBG Potassium   


 


Sodium   


 


Chloride   


 


Glucose   


 


Lactate   


 


FiO2   


 


Potassium   


 


Carbon Dioxide   


 


Anion Gap   


 


BUN   


 


Creatinine   


 


Est GFR ( Amer)   


 


Est GFR (Non-Af Amer)   


 


POC Glucose (mg/dL)  166 H  132 H  132 H


 


Random Glucose   


 


Calcium   


 


Total Bilirubin   


 


AST   


 


ALT   


 


Alkaline Phosphatase   


 


Ammonia   


 


Total Protein   


 


Albumin   


 


Globulin   


 


Albumin/Globulin Ratio   


 


Venous Blood Potassium   


 


Urine Color   


 


Urine Appearance   


 


Urine pH   


 


Ur Specific Gravity   


 


Urine Protein   


 


Urine Glucose (UA)   


 


Urine Ketones   


 


Urine Blood   


 


Urine Nitrate   


 


Urine Bilirubin   


 


Urine Urobilinogen   


 


Ur Leukocyte Esterase   


 


Urine RBC   


 


Urine WBC   


 


Ur Epithelial Cells   


 


Urine Bacteria   














  04/13/18 04/13/18 04/13/18





  05:56 06:00 06:00


 


WBC    16.5 H


 


RBC    3.47 L


 


Hgb    12.0  D


 


Hct    34.9 L


 


MCV    100.6


 


MCH    34.6


 


MCHC    34.4


 


RDW    14.1


 


Plt Count    160


 


MPV    10.2


 


Gran %    96.0 H


 


Lymph % (Auto)    1.9 L


 


Mono % (Auto)    1.9


 


Eos % (Auto)    0.1 L


 


Baso % (Auto)    0.1


 


Gran #    15.87 H


 


Lymph # (Auto)    0.3 L


 


Mono # (Auto)    0.3


 


Eos # (Auto)    0.0


 


Baso # (Auto)    0.01


 


PT   


 


INR   


 


pO2   


 


VBG pH   


 


VBG pCO2   


 


VBG HCO3   


 


VBG Total CO2   


 


VBG O2 Sat (Calc)   


 


VBG Base Excess   


 


VBG Potassium   


 


Sodium   132 


 


Chloride   91 L 


 


Glucose   


 


Lactate   


 


FiO2   


 


Potassium   3.5 L 


 


Carbon Dioxide   30 


 


Anion Gap   15 


 


BUN   20 


 


Creatinine   1.4 H 


 


Est GFR ( Amer)   47 


 


Est GFR (Non-Af Amer)   39 


 


POC Glucose (mg/dL)  133 H  


 


Random Glucose   134 H 


 


Calcium   9.6 


 


Total Bilirubin   4.6 H 


 


AST   6880 H 


 


ALT   4792 H 


 


Alkaline Phosphatase   118 


 


Ammonia   


 


Total Protein   5.9 


 


Albumin   3.5 


 


Globulin   2.5 


 


Albumin/Globulin Ratio   1.4 


 


Venous Blood Potassium   


 


Urine Color   


 


Urine Appearance   


 


Urine pH   


 


Ur Specific Gravity   


 


Urine Protein   


 


Urine Glucose (UA)   


 


Urine Ketones   


 


Urine Blood   


 


Urine Nitrate   


 


Urine Bilirubin   


 


Urine Urobilinogen   


 


Ur Leukocyte Esterase   


 


Urine RBC   


 


Urine WBC   


 


Ur Epithelial Cells   


 


Urine Bacteria   














  04/13/18 04/13/18





  06:00 08:00


 


WBC  


 


RBC  


 


Hgb  


 


Hct  


 


MCV  


 


MCH  


 


MCHC  


 


RDW  


 


Plt Count  


 


MPV  


 


Gran %  


 


Lymph % (Auto)  


 


Mono % (Auto)  


 


Eos % (Auto)  


 


Baso % (Auto)  


 


Gran #  


 


Lymph # (Auto)  


 


Mono # (Auto)  


 


Eos # (Auto)  


 


Baso # (Auto)  


 


PT  39.1 H 


 


INR  3.32 H 


 


pO2  


 


VBG pH  


 


VBG pCO2  


 


VBG HCO3  


 


VBG Total CO2  


 


VBG O2 Sat (Calc)  


 


VBG Base Excess  


 


VBG Potassium  


 


Sodium  


 


Chloride  


 


Glucose  


 


Lactate  


 


FiO2  


 


Potassium  


 


Carbon Dioxide  


 


Anion Gap  


 


BUN  


 


Creatinine  


 


Est GFR ( Amer)  


 


Est GFR (Non-Af Amer)  


 


POC Glucose (mg/dL)   153 H


 


Random Glucose  


 


Calcium  


 


Total Bilirubin  


 


AST  


 


ALT  


 


Alkaline Phosphatase  


 


Ammonia  


 


Total Protein  


 


Albumin  


 


Globulin  


 


Albumin/Globulin Ratio  


 


Venous Blood Potassium  


 


Urine Color  


 


Urine Appearance  


 


Urine pH  


 


Ur Specific Gravity  


 


Urine Protein  


 


Urine Glucose (UA)  


 


Urine Ketones  


 


Urine Blood  


 


Urine Nitrate  


 


Urine Bilirubin  


 


Urine Urobilinogen  


 


Ur Leukocyte Esterase  


 


Urine RBC  


 


Urine WBC  


 


Ur Epithelial Cells  


 


Urine Bacteria  











Fingerstick Blood Sugar Results: 153





Review of Systems





- Review of Systems


All systems: reviewed and no additional remarkable complaints except (HPI)





Critical Care Progress Note





- Nutrition


Nutrition: 


 Nutrition











 Category Date Time Status


 


 NPO Diet [DIET] Diets  04/12/18 Dinner Ordered














Assessment/Plan





- Assessment and Plan (Free Text)


Assessment: 





57 year old female with a PMHx of ETOH abuse (last drink 3-4 days ago), tobacco 

abuse, and HTN who presents to the ED with SOB, chest pain and abdominal pain. 

Pt found to have sepsis (with hypothermic 94.9, leukocytosis 18.8 and 

tachycardic ), Lactic acidosis 16.8, elevated LFTs (AST 68151; ALT 6554; 

T bili 4.9) and hypoglycemic with blood sugars <20. 





Neuro: 


- Stable 


- AAO x 3 





Pulm: 


- Maintain SaO2 90-94 as patient is active smoker  with possible COPD 


- 2L NC as needed 





CV: 


- Hemodynamically stable 


- Maintain map >65   


- Started Hydralazine PRN for hypertension 





GI: 


- Elevated LFTs - trending down 


- Discriminative factor of 228; MELD score of 34 on admission 


- Cont NAC, and started on prednisolone


- Lactulose 30g BID, titrate to 2-3BMs/day


- Dr Trinidad, Hepatology attending and Dr Wolfe ICU fellow - at [facility], 

was contacted and discussed case. Pt is likely not liver transplant candidate, 

given her history of recent EtOH abuse, but would possibly benefit from 

transfer to their facility. There are no beds available in their ICU, will 

await transfer once bed available. 


- GI consulted for recs 


- Cont to monitor and trend LFTs


- F/u surgery recs - possible cholecystitis? 


- F/u hepatitis panel


- CT abd and abd US reviewed 


- GI ppx 





Renal: 


- Monitor I/O 


- Replete electrolytes as needed 





Endo: 


- Hypoglycemic to blood sugars <20


- Q4H finger sticks 


- Currently on D5 & 1/2NS @ 75


- Maintain euglycemic with blood sugars 140-180





ID: 


- Sepsis (with hypothermic 94.9, leukocytosis 18.8 and tachycardic )


- Lactic acidosis trending down probably 2/2 to potential liver failure 


- Cont Zosyn 


- F/u Septic work up 





Heme


- INR elevated at 3.32 will cont to monitor 


- Monitor H/H 


- FFP and vit K x 1 over night 








Pt and plan was reviewed and discussed with Dr Blanc. 





<Yan Blanc - Last Filed: 04/13/18 10:59>





CCU Objective





- Vital Signs / Intake & Output


Vital Signs (Last 4 hours): 


Vital Signs











  Temp Pulse Resp BP Pulse Ox


 


 04/13/18 10:10   130 H  16   97


 


 04/13/18 10:09   132 H  21  


 


 04/13/18 10:08   128 H  30 H  


 


 04/13/18 10:07   126 H  17  


 


 04/13/18 10:06   125 H  20  


 


 04/13/18 10:00   112 H  23  142/73  97


 


 04/13/18 09:50   97 H  27 H  085/94 H  97


 


 04/13/18 09:40   85  24   97


 


 04/13/18 09:38   98 H  27 H  185/94 H  82 L


 


 04/13/18 09:30   82  14   94 L


 


 04/13/18 09:20   85  17   96


 


 04/13/18 09:18   94 H  28 H  


 


 04/13/18 09:10   85  15   96


 


 04/13/18 09:01   91 H  17  176/105 H  80 L


 


 04/13/18 09:00   94 H  22   93 L


 


 04/13/18 08:50   93 H  30 H   96


 


 04/13/18 08:40   107 H  18   98


 


 04/13/18 08:30   79  16   97


 


 04/13/18 08:20   84  16   96


 


 04/13/18 08:10   84  17   96


 


 04/13/18 08:01   83  22  158/85 H  97


 


 04/13/18 08:00   81  13   97


 


 04/13/18 07:50   82  16   96


 


 04/13/18 07:45   86  14  163/128 H  93 L


 


 04/13/18 07:40   86  29 H   97


 


 04/13/18 07:30   107 H  24   98


 


 04/13/18 07:20   84  12   97


 


 04/13/18 07:10   83  19   97


 


 04/13/18 07:00  98.4 F  84  16  168/93 H  97











Intake and Output (Last 8hrs): 


 Intake & Output











 04/12/18 04/13/18 04/13/18





 22:59 06:59 14:59


 


Intake Total 710 2188 


 


Output Total 0 4 


 


Balance 710 2184 


 


Weight 135 lb 135 lb 


 


Intake:   


 


   2188 


 


    Right Antecubital 650  


 


    abx  200 


 


    acetadote  1000 


 


    d5 0.9  400 


 


    ffp  588 


 


  Oral 60  


 


Output:   


 


  Urine 0  


 


    Urine, Voided 0  


 


  Urine/Stool Mix  4 


 


Other:   


 


  # Voids   


 


    Urine, Voided  4 














- Medications


Active Medications: 


Active Medications











Generic Name Dose Route Start Last Admin





  Trade Name Freq  PRN Reason Stop Dose Admin


 


Hydralazine HCl  10 mg  04/13/18 09:40  04/13/18 09:50





  Apresoline  IVP   10 mg





  Q6 PRN   Administration





  Other   


 


Piperacillin Sod/Tazobactam Sod  2.25 gm in 100 mls @ 200 mls/hr  04/12/18 18:

00  04/13/18 05:27





  Zosyn 2.25 Gm In 0.9% 100 Ml  IVPB   200 mls/hr





  Q6 MELLISSA   Administration





  Protocol   


 


Acetylcysteine 6,120 mg/  1,030.6 mls @ 62.5 mls/hr  04/12/18 23:00  04/12/18 23

:26





  Dextrose  IVPB  04/13/18 15:29  62.5 mls/hr





  ONCE ONE   Administration


 


Dextrose/Sodium Chloride  1,000 mls @ 75 mls/hr  04/13/18 09:42  04/13/18 09:42





  Dextrose 5%/0.9% Ns 1000 Ml  IV   75 mls/hr





  [id number] MELLISSA   Administration


 


Lactulose  30 gm  04/12/18 18:45  04/13/18 09:30





  Enulose  PO   30 gm





  BID MELLISSA   Administration


 


Prednisolone  40 mg  04/12/18 17:30  04/13/18 09:38





  Prednisolone Oral Soln  PO   40 mg





  DAILY MELLISSA   Administration














- Patient Studies


Lab Studies: 


 Lab Studies











  04/13/18 04/13/18 04/13/18 Range/Units





  08:00 06:00 06:00 


 


WBC    16.5 H  (4.5-11.0)  10^3/ul


 


RBC    3.47 L  (3.5-6.1)  10^6/uL


 


Hgb    12.0  D  (12.0-16.0)  g/dL


 


Hct    34.9 L  (36.0-48.0)  %


 


MCV    100.6  (80.0-105.0)  fl


 


MCH    34.6  (25.0-35.0)  pg


 


MCHC    34.4  (31.0-37.0)  g/dl


 


RDW    14.1  (11.5-14.5)  %


 


Plt Count    160  (120.0-450.0)  10^3/uL


 


MPV    10.2  (7.0-11.0)  fl


 


Gran %    96.0 H  (50.0-68.0)  %


 


Lymph % (Auto)    1.9 L  (22.0-35.0)  %


 


Mono % (Auto)    1.9  (1.0-6.0)  %


 


Eos % (Auto)    0.1 L  (1.5-5.0)  %


 


Baso % (Auto)    0.1  (0.0-3.0)  %


 


Gran #    15.87 H  (1.4-6.5)  


 


Lymph # (Auto)    0.3 L  (1.2-3.4)  


 


Mono # (Auto)    0.3  (0.1-0.6)  


 


Eos # (Auto)    0.0  (0.0-0.7)  


 


Baso # (Auto)    0.01  (0.0-2.0)  K/mm3


 


PT   39.1 H   (9.4-12.5)  SECONDS


 


INR   3.32 H   (0.93-1.08)  


 


pO2     (30-55)  mm/Hg


 


VBG pH     (7.32-7.43)  


 


VBG pCO2     (40-60)  


 


VBG HCO3     (21-28)  mmol/l


 


VBG Total CO2     (22-28)  mmol.L


 


VBG O2 Sat (Calc)     (40-65)  %


 


VBG Base Excess     (0.0-2.0)  mmol/L


 


VBG Potassium     (3.6-5.2)  mmol/L


 


Sodium     (132-148)  mmol/L


 


Chloride     ()  mmol/L


 


Glucose     ()  mg/dl


 


Lactate     (0.7-2.1)  mmol/L


 


FiO2     %


 


Potassium     (3.6-5.0)  mmol/L


 


Carbon Dioxide     (21-33)  mmol/L


 


Anion Gap     (10-20)  


 


BUN     (7-21)  mg/dL


 


Creatinine     (0.7-1.2)  mg/dl


 


Est GFR (African Amer)     


 


Est GFR (Non-Af Amer)     


 


POC Glucose (mg/dL)  153 H    ()  mg/dL


 


Random Glucose     ()  mg/dL


 


Calcium     (8.4-10.5)  mg/dL


 


Total Bilirubin     (0.2-1.3)  mg/dL


 


AST     (14-36)  U/L


 


ALT     (7-56)  U/L


 


Alkaline Phosphatase     ()  U/L


 


Ammonia     (9-33)  umol/L


 


Total Protein     (5.8-8.3)  g/dL


 


Albumin     (3.0-4.8)  g/dL


 


Globulin     gm/dL


 


Albumin/Globulin Ratio     (1.1-1.8)  


 


Venous Blood Potassium     (3.6-5.2)  mmol/L


 


Urine Color     (YELLOW)  


 


Urine Appearance     (CLEAR)  


 


Urine pH     (4.7-8.0)  


 


Ur Specific Gravity     (1.005-1.035)  


 


Urine Protein     (<30 mg/dL)  mg/dL


 


Urine Glucose (UA)     (NEGATIVE)  mg/dL


 


Urine Ketones     (NEGATIVE)  mg/dL


 


Urine Blood     (NEGATIVE)  


 


Urine Nitrate     (NEGATIVE)  


 


Urine Bilirubin     (NEGATIVE)  


 


Urine Urobilinogen     (<1 E.U./dL)  E.U./dL


 


Ur Leukocyte Esterase     (NEGATIVE)  Rigoberto/uL


 


Urine RBC     (0-2)  /hpf


 


Urine WBC     (0-6)  /hpf


 


Ur Epithelial Cells     (0-5)  /hpf


 


Urine Bacteria     (NEG)  














  04/13/18 04/13/18 04/13/18 Range/Units





  06:00 05:56 03:57 


 


WBC     (4.5-11.0)  10^3/ul


 


RBC     (3.5-6.1)  10^6/uL


 


Hgb     (12.0-16.0)  g/dL


 


Hct     (36.0-48.0)  %


 


MCV     (80.0-105.0)  fl


 


MCH     (25.0-35.0)  pg


 


MCHC     (31.0-37.0)  g/dl


 


RDW     (11.5-14.5)  %


 


Plt Count     (120.0-450.0)  10^3/uL


 


MPV     (7.0-11.0)  fl


 


Gran %     (50.0-68.0)  %


 


Lymph % (Auto)     (22.0-35.0)  %


 


Mono % (Auto)     (1.0-6.0)  %


 


Eos % (Auto)     (1.5-5.0)  %


 


Baso % (Auto)     (0.0-3.0)  %


 


Gran #     (1.4-6.5)  


 


Lymph # (Auto)     (1.2-3.4)  


 


Mono # (Auto)     (0.1-0.6)  


 


Eos # (Auto)     (0.0-0.7)  


 


Baso # (Auto)     (0.0-2.0)  K/mm3


 


PT     (9.4-12.5)  SECONDS


 


INR     (0.93-1.08)  


 


pO2     (30-55)  mm/Hg


 


VBG pH     (7.32-7.43)  


 


VBG pCO2     (40-60)  


 


VBG HCO3     (21-28)  mmol/l


 


VBG Total CO2     (22-28)  mmol.L


 


VBG O2 Sat (Calc)     (40-65)  %


 


VBG Base Excess     (0.0-2.0)  mmol/L


 


VBG Potassium     (3.6-5.2)  mmol/L


 


Sodium  132    (132-148)  mmol/L


 


Chloride  91 L    ()  mmol/L


 


Glucose     ()  mg/dl


 


Lactate     (0.7-2.1)  mmol/L


 


FiO2     %


 


Potassium  3.5 L    (3.6-5.0)  mmol/L


 


Carbon Dioxide  30    (21-33)  mmol/L


 


Anion Gap  15    (10-20)  


 


BUN  20    (7-21)  mg/dL


 


Creatinine  1.4 H    (0.7-1.2)  mg/dl


 


Est GFR ( Amer)  47    


 


Est GFR (Non-Af Amer)  39    


 


POC Glucose (mg/dL)   133 H  132 H  ()  mg/dL


 


Random Glucose  134 H    ()  mg/dL


 


Calcium  9.6    (8.4-10.5)  mg/dL


 


Total Bilirubin  4.6 H    (0.2-1.3)  mg/dL


 


AST  6880 H    (14-36)  U/L


 


ALT  4792 H    (7-56)  U/L


 


Alkaline Phosphatase  118    ()  U/L


 


Ammonia     (9-33)  umol/L


 


Total Protein  5.9    (5.8-8.3)  g/dL


 


Albumin  3.5    (3.0-4.8)  g/dL


 


Globulin  2.5    gm/dL


 


Albumin/Globulin Ratio  1.4    (1.1-1.8)  


 


Venous Blood Potassium     (3.6-5.2)  mmol/L


 


Urine Color     (YELLOW)  


 


Urine Appearance     (CLEAR)  


 


Urine pH     (4.7-8.0)  


 


Ur Specific Gravity     (1.005-1.035)  


 


Urine Protein     (<30 mg/dL)  mg/dL


 


Urine Glucose (UA)     (NEGATIVE)  mg/dL


 


Urine Ketones     (NEGATIVE)  mg/dL


 


Urine Blood     (NEGATIVE)  


 


Urine Nitrate     (NEGATIVE)  


 


Urine Bilirubin     (NEGATIVE)  


 


Urine Urobilinogen     (<1 E.U./dL)  E.U./dL


 


Ur Leukocyte Esterase     (NEGATIVE)  Rigoberto/uL


 


Urine RBC     (0-2)  /hpf


 


Urine WBC     (0-6)  /hpf


 


Ur Epithelial Cells     (0-5)  /hpf


 


Urine Bacteria     (NEG)  














  04/13/18 04/13/18 04/12/18 Range/Units





  01:55 00:07 22:43 


 


WBC     (4.5-11.0)  10^3/ul


 


RBC     (3.5-6.1)  10^6/uL


 


Hgb     (12.0-16.0)  g/dL


 


Hct     (36.0-48.0)  %


 


MCV     (80.0-105.0)  fl


 


MCH     (25.0-35.0)  pg


 


MCHC     (31.0-37.0)  g/dl


 


RDW     (11.5-14.5)  %


 


Plt Count     (120.0-450.0)  10^3/uL


 


MPV     (7.0-11.0)  fl


 


Gran %     (50.0-68.0)  %


 


Lymph % (Auto)     (22.0-35.0)  %


 


Mono % (Auto)     (1.0-6.0)  %


 


Eos % (Auto)     (1.5-5.0)  %


 


Baso % (Auto)     (0.0-3.0)  %


 


Gran #     (1.4-6.5)  


 


Lymph # (Auto)     (1.2-3.4)  


 


Mono # (Auto)     (0.1-0.6)  


 


Eos # (Auto)     (0.0-0.7)  


 


Baso # (Auto)     (0.0-2.0)  K/mm3


 


PT     (9.4-12.5)  SECONDS


 


INR     (0.93-1.08)  


 


pO2     (30-55)  mm/Hg


 


VBG pH     (7.32-7.43)  


 


VBG pCO2     (40-60)  


 


VBG HCO3     (21-28)  mmol/l


 


VBG Total CO2     (22-28)  mmol.L


 


VBG O2 Sat (Calc)     (40-65)  %


 


VBG Base Excess     (0.0-2.0)  mmol/L


 


VBG Potassium     (3.6-5.2)  mmol/L


 


Sodium     (132-148)  mmol/L


 


Chloride     ()  mmol/L


 


Glucose     ()  mg/dl


 


Lactate     (0.7-2.1)  mmol/L


 


FiO2     %


 


Potassium     (3.6-5.0)  mmol/L


 


Carbon Dioxide     (21-33)  mmol/L


 


Anion Gap     (10-20)  


 


BUN     (7-21)  mg/dL


 


Creatinine     (0.7-1.2)  mg/dl


 


Est GFR (African Amer)     


 


Est GFR (Non-Af Amer)     


 


POC Glucose (mg/dL)  132 H  166 H  154 H  ()  mg/dL


 


Random Glucose     ()  mg/dL


 


Calcium     (8.4-10.5)  mg/dL


 


Total Bilirubin     (0.2-1.3)  mg/dL


 


AST     (14-36)  U/L


 


ALT     (7-56)  U/L


 


Alkaline Phosphatase     ()  U/L


 


Ammonia     (9-33)  umol/L


 


Total Protein     (5.8-8.3)  g/dL


 


Albumin     (3.0-4.8)  g/dL


 


Globulin     gm/dL


 


Albumin/Globulin Ratio     (1.1-1.8)  


 


Venous Blood Potassium     (3.6-5.2)  mmol/L


 


Urine Color     (YELLOW)  


 


Urine Appearance     (CLEAR)  


 


Urine pH     (4.7-8.0)  


 


Ur Specific Gravity     (1.005-1.035)  


 


Urine Protein     (<30 mg/dL)  mg/dL


 


Urine Glucose (UA)     (NEGATIVE)  mg/dL


 


Urine Ketones     (NEGATIVE)  mg/dL


 


Urine Blood     (NEGATIVE)  


 


Urine Nitrate     (NEGATIVE)  


 


Urine Bilirubin     (NEGATIVE)  


 


Urine Urobilinogen     (<1 E.U./dL)  E.U./dL


 


Ur Leukocyte Esterase     (NEGATIVE)  Rigoberto/uL


 


Urine RBC     (0-2)  /hpf


 


Urine WBC     (0-6)  /hpf


 


Ur Epithelial Cells     (0-5)  /hpf


 


Urine Bacteria     (NEG)  














  04/12/18 04/12/18 04/12/18 Range/Units





  20:47 20:00 20:00 


 


WBC     (4.5-11.0)  10^3/ul


 


RBC     (3.5-6.1)  10^6/uL


 


Hgb     (12.0-16.0)  g/dL


 


Hct     (36.0-48.0)  %


 


MCV     (80.0-105.0)  fl


 


MCH     (25.0-35.0)  pg


 


MCHC     (31.0-37.0)  g/dl


 


RDW     (11.5-14.5)  %


 


Plt Count     (120.0-450.0)  10^3/uL


 


MPV     (7.0-11.0)  fl


 


Gran %     (50.0-68.0)  %


 


Lymph % (Auto)     (22.0-35.0)  %


 


Mono % (Auto)     (1.0-6.0)  %


 


Eos % (Auto)     (1.5-5.0)  %


 


Baso % (Auto)     (0.0-3.0)  %


 


Gran #     (1.4-6.5)  


 


Lymph # (Auto)     (1.2-3.4)  


 


Mono # (Auto)     (0.1-0.6)  


 


Eos # (Auto)     (0.0-0.7)  


 


Baso # (Auto)     (0.0-2.0)  K/mm3


 


PT     (9.4-12.5)  SECONDS


 


INR     (0.93-1.08)  


 


pO2    71 H  (30-55)  mm/Hg


 


VBG pH    7.46 H  (7.32-7.43)  


 


VBG pCO2    32.0 L  (40-60)  


 


VBG HCO3    22.8  (21-28)  mmol/l


 


VBG Total CO2    23.8  (22-28)  mmol.L


 


VBG O2 Sat (Calc)    97.7 H  (40-65)  %


 


VBG Base Excess    -0.3 L  (0.0-2.0)  mmol/L


 


VBG Potassium    5.0  (3.6-5.2)  mmol/L


 


Sodium   130 L  130.0 L  (132-148)  mmol/L


 


Chloride   90 L  91.0 L  ()  mmol/L


 


Glucose    124 H  ()  mg/dl


 


Lactate    7.9 H*  (0.7-2.1)  mmol/L


 


FiO2    21.0  %


 


Potassium   4.8   (3.6-5.0)  mmol/L


 


Carbon Dioxide   22   (21-33)  mmol/L


 


Anion Gap   22 H   (10-20)  


 


BUN   22 H   (7-21)  mg/dL


 


Creatinine   1.4 H   (0.7-1.2)  mg/dl


 


Est GFR ( Amer)   47   


 


Est GFR (Non-Af Amer)   39   


 


POC Glucose (mg/dL)  98    ()  mg/dL


 


Random Glucose   111 H   ()  mg/dL


 


Calcium   9.8   (8.4-10.5)  mg/dL


 


Total Bilirubin   4.7 H   (0.2-1.3)  mg/dL


 


AST   9793 H   (14-36)  U/L


 


ALT   5948 H   (7-56)  U/L


 


Alkaline Phosphatase   72   ()  U/L


 


Ammonia     (9-33)  umol/L


 


Total Protein   6.0   (5.8-8.3)  g/dL


 


Albumin   3.5   (3.0-4.8)  g/dL


 


Globulin   2.4   gm/dL


 


Albumin/Globulin Ratio   1.5   (1.1-1.8)  


 


Venous Blood Potassium    5.0  (3.6-5.2)  mmol/L


 


Urine Color     (YELLOW)  


 


Urine Appearance     (CLEAR)  


 


Urine pH     (4.7-8.0)  


 


Ur Specific Gravity     (1.005-1.035)  


 


Urine Protein     (<30 mg/dL)  mg/dL


 


Urine Glucose (UA)     (NEGATIVE)  mg/dL


 


Urine Ketones     (NEGATIVE)  mg/dL


 


Urine Blood     (NEGATIVE)  


 


Urine Nitrate     (NEGATIVE)  


 


Urine Bilirubin     (NEGATIVE)  


 


Urine Urobilinogen     (<1 E.U./dL)  E.U./dL


 


Ur Leukocyte Esterase     (NEGATIVE)  Rigoberto/uL


 


Urine RBC     (0-2)  /hpf


 


Urine WBC     (0-6)  /hpf


 


Ur Epithelial Cells     (0-5)  /hpf


 


Urine Bacteria     (NEG)  














  04/12/18 04/12/18 04/12/18 Range/Units





  20:00 18:00 18:00 


 


WBC     (4.5-11.0)  10^3/ul


 


RBC     (3.5-6.1)  10^6/uL


 


Hgb     (12.0-16.0)  g/dL


 


Hct     (36.0-48.0)  %


 


MCV     (80.0-105.0)  fl


 


MCH     (25.0-35.0)  pg


 


MCHC     (31.0-37.0)  g/dl


 


RDW     (11.5-14.5)  %


 


Plt Count     (120.0-450.0)  10^3/uL


 


MPV     (7.0-11.0)  fl


 


Gran %     (50.0-68.0)  %


 


Lymph % (Auto)     (22.0-35.0)  %


 


Mono % (Auto)     (1.0-6.0)  %


 


Eos % (Auto)     (1.5-5.0)  %


 


Baso % (Auto)     (0.0-3.0)  %


 


Gran #     (1.4-6.5)  


 


Lymph # (Auto)     (1.2-3.4)  


 


Mono # (Auto)     (0.1-0.6)  


 


Eos # (Auto)     (0.0-0.7)  


 


Baso # (Auto)     (0.0-2.0)  K/mm3


 


PT  102.7 H    (9.4-12.5)  SECONDS


 


INR  8.62 H*    (0.93-1.08)  


 


pO2    41  (30-55)  mm/Hg


 


VBG pH    7.43  (7.32-7.43)  


 


VBG pCO2    33.0 L  (40-60)  


 


VBG HCO3    21.9  (21-28)  mmol/l


 


VBG Total CO2    22.9  (22-28)  mmol.L


 


VBG O2 Sat (Calc)    81.4 H  (40-65)  %


 


VBG Base Excess    -1.7 L  (0.0-2.0)  mmol/L


 


VBG Potassium    5.8 H  (3.6-5.2)  mmol/L


 


Sodium    131.0 L  (132-148)  mmol/L


 


Chloride    91.0 L  ()  mmol/L


 


Glucose    108 H  ()  mg/dl


 


Lactate    10.5 H*  (0.7-2.1)  mmol/L


 


FiO2    21.0  %


 


Potassium     (3.6-5.0)  mmol/L


 


Carbon Dioxide     (21-33)  mmol/L


 


Anion Gap     (10-20)  


 


BUN     (7-21)  mg/dL


 


Creatinine     (0.7-1.2)  mg/dl


 


Est GFR (African Amer)     


 


Est GFR (Non-Af Amer)     


 


POC Glucose (mg/dL)     ()  mg/dL


 


Random Glucose     ()  mg/dL


 


Calcium     (8.4-10.5)  mg/dL


 


Total Bilirubin     (0.2-1.3)  mg/dL


 


AST     (14-36)  U/L


 


ALT     (7-56)  U/L


 


Alkaline Phosphatase     ()  U/L


 


Ammonia   25   (9-33)  umol/L


 


Total Protein     (5.8-8.3)  g/dL


 


Albumin     (3.0-4.8)  g/dL


 


Globulin     gm/dL


 


Albumin/Globulin Ratio     (1.1-1.8)  


 


Venous Blood Potassium    5.8 H  (3.6-5.2)  mmol/L


 


Urine Color     (YELLOW)  


 


Urine Appearance     (CLEAR)  


 


Urine pH     (4.7-8.0)  


 


Ur Specific Gravity     (1.005-1.035)  


 


Urine Protein     (<30 mg/dL)  mg/dL


 


Urine Glucose (UA)     (NEGATIVE)  mg/dL


 


Urine Ketones     (NEGATIVE)  mg/dL


 


Urine Blood     (NEGATIVE)  


 


Urine Nitrate     (NEGATIVE)  


 


Urine Bilirubin     (NEGATIVE)  


 


Urine Urobilinogen     (<1 E.U./dL)  E.U./dL


 


Ur Leukocyte Esterase     (NEGATIVE)  Rigoberto/uL


 


Urine RBC     (0-2)  /hpf


 


Urine WBC     (0-6)  /hpf


 


Ur Epithelial Cells     (0-5)  /hpf


 


Urine Bacteria     (NEG)  














  04/12/18 04/12/18 Range/Units





  15:50 15:05 


 


WBC    (4.5-11.0)  10^3/ul


 


RBC    (3.5-6.1)  10^6/uL


 


Hgb    (12.0-16.0)  g/dL


 


Hct    (36.0-48.0)  %


 


MCV    (80.0-105.0)  fl


 


MCH    (25.0-35.0)  pg


 


MCHC    (31.0-37.0)  g/dl


 


RDW    (11.5-14.5)  %


 


Plt Count    (120.0-450.0)  10^3/uL


 


MPV    (7.0-11.0)  fl


 


Gran %    (50.0-68.0)  %


 


Lymph % (Auto)    (22.0-35.0)  %


 


Mono % (Auto)    (1.0-6.0)  %


 


Eos % (Auto)    (1.5-5.0)  %


 


Baso % (Auto)    (0.0-3.0)  %


 


Gran #    (1.4-6.5)  


 


Lymph # (Auto)    (1.2-3.4)  


 


Mono # (Auto)    (0.1-0.6)  


 


Eos # (Auto)    (0.0-0.7)  


 


Baso # (Auto)    (0.0-2.0)  K/mm3


 


PT    (9.4-12.5)  SECONDS


 


INR    (0.93-1.08)  


 


pO2    (30-55)  mm/Hg


 


VBG pH    (7.32-7.43)  


 


VBG pCO2    (40-60)  


 


VBG HCO3    (21-28)  mmol/l


 


VBG Total CO2    (22-28)  mmol.L


 


VBG O2 Sat (Calc)    (40-65)  %


 


VBG Base Excess    (0.0-2.0)  mmol/L


 


VBG Potassium    (3.6-5.2)  mmol/L


 


Sodium    (132-148)  mmol/L


 


Chloride    ()  mmol/L


 


Glucose    ()  mg/dl


 


Lactate    (0.7-2.1)  mmol/L


 


FiO2    %


 


Potassium    (3.6-5.0)  mmol/L


 


Carbon Dioxide    (21-33)  mmol/L


 


Anion Gap    (10-20)  


 


BUN    (7-21)  mg/dL


 


Creatinine    (0.7-1.2)  mg/dl


 


Est GFR (African Amer)    


 


Est GFR (Non-Af Amer)    


 


POC Glucose (mg/dL)   77  ()  mg/dL


 


Random Glucose    ()  mg/dL


 


Calcium    (8.4-10.5)  mg/dL


 


Total Bilirubin    (0.2-1.3)  mg/dL


 


AST    (14-36)  U/L


 


ALT    (7-56)  U/L


 


Alkaline Phosphatase    ()  U/L


 


Ammonia    (9-33)  umol/L


 


Total Protein    (5.8-8.3)  g/dL


 


Albumin    (3.0-4.8)  g/dL


 


Globulin    gm/dL


 


Albumin/Globulin Ratio    (1.1-1.8)  


 


Venous Blood Potassium    (3.6-5.2)  mmol/L


 


Urine Color  Dark yellow   (YELLOW)  


 


Urine Appearance  Sl cloudy   (CLEAR)  


 


Urine pH  5.5   (4.7-8.0)  


 


Ur Specific Gravity  >= 1.030   (1.005-1.035)  


 


Urine Protein  100 H   (<30 mg/dL)  mg/dL


 


Urine Glucose (UA)  Negative   (NEGATIVE)  mg/dL


 


Urine Ketones  Trace H   (NEGATIVE)  mg/dL


 


Urine Blood  Small H   (NEGATIVE)  


 


Urine Nitrate  Negative   (NEGATIVE)  


 


Urine Bilirubin  Moderate H   (NEGATIVE)  


 


Urine Urobilinogen  0.2   (<1 E.U./dL)  E.U./dL


 


Ur Leukocyte Esterase  Trace H   (NEGATIVE)  Rigoberto/uL


 


Urine RBC  1 - 3   (0-2)  /hpf


 


Urine WBC  5 - 10   (0-6)  /hpf


 


Ur Epithelial Cells  1 - 3   (0-5)  /hpf


 


Urine Bacteria  Mod   (NEG)  








 Laboratory Results - last 24 hr











  04/12/18 04/12/18 04/12/18





  15:05 15:50 18:00


 


WBC   


 


RBC   


 


Hgb   


 


Hct   


 


MCV   


 


MCH   


 


MCHC   


 


RDW   


 


Plt Count   


 


MPV   


 


Gran %   


 


Lymph % (Auto)   


 


Mono % (Auto)   


 


Eos % (Auto)   


 


Baso % (Auto)   


 


Gran #   


 


Lymph # (Auto)   


 


Mono # (Auto)   


 


Eos # (Auto)   


 


Baso # (Auto)   


 


PT   


 


INR   


 


pO2    41


 


VBG pH    7.43


 


VBG pCO2    33.0 L


 


VBG HCO3    21.9


 


VBG Total CO2    22.9


 


VBG O2 Sat (Calc)    81.4 H


 


VBG Base Excess    -1.7 L


 


VBG Potassium    5.8 H


 


Sodium    131.0 L


 


Chloride    91.0 L


 


Glucose    108 H


 


Lactate    10.5 H*


 


FiO2    21.0


 


Potassium   


 


Carbon Dioxide   


 


Anion Gap   


 


BUN   


 


Creatinine   


 


Est GFR ( Amer)   


 


Est GFR (Non-Af Amer)   


 


POC Glucose (mg/dL)  77  


 


Random Glucose   


 


Calcium   


 


Total Bilirubin   


 


AST   


 


ALT   


 


Alkaline Phosphatase   


 


Ammonia   


 


Total Protein   


 


Albumin   


 


Globulin   


 


Albumin/Globulin Ratio   


 


Venous Blood Potassium    5.8 H


 


Urine Color   Dark yellow 


 


Urine Appearance   Sl cloudy 


 


Urine pH   5.5 


 


Ur Specific Gravity   >= 1.030 


 


Urine Protein   100 H 


 


Urine Glucose (UA)   Negative 


 


Urine Ketones   Trace H 


 


Urine Blood   Small H 


 


Urine Nitrate   Negative 


 


Urine Bilirubin   Moderate H 


 


Urine Urobilinogen   0.2 


 


Ur Leukocyte Esterase   Trace H 


 


Urine RBC   1 - 3 


 


Urine WBC   5 - 10 


 


Ur Epithelial Cells   1 - 3 


 


Urine Bacteria   Mod 














  04/12/18 04/12/18 04/12/18





  18:00 20:00 20:00


 


WBC   


 


RBC   


 


Hgb   


 


Hct   


 


MCV   


 


MCH   


 


MCHC   


 


RDW   


 


Plt Count   


 


MPV   


 


Gran %   


 


Lymph % (Auto)   


 


Mono % (Auto)   


 


Eos % (Auto)   


 


Baso % (Auto)   


 


Gran #   


 


Lymph # (Auto)   


 


Mono # (Auto)   


 


Eos # (Auto)   


 


Baso # (Auto)   


 


PT   102.7 H 


 


INR   8.62 H* 


 


pO2    71 H


 


VBG pH    7.46 H


 


VBG pCO2    32.0 L


 


VBG HCO3    22.8


 


VBG Total CO2    23.8


 


VBG O2 Sat (Calc)    97.7 H


 


VBG Base Excess    -0.3 L


 


VBG Potassium    5.0


 


Sodium    130.0 L


 


Chloride    91.0 L


 


Glucose    124 H


 


Lactate    7.9 H*


 


FiO2    21.0


 


Potassium   


 


Carbon Dioxide   


 


Anion Gap   


 


BUN   


 


Creatinine   


 


Est GFR ( Amer)   


 


Est GFR (Non-Af Amer)   


 


POC Glucose (mg/dL)   


 


Random Glucose   


 


Calcium   


 


Total Bilirubin   


 


AST   


 


ALT   


 


Alkaline Phosphatase   


 


Ammonia  25  


 


Total Protein   


 


Albumin   


 


Globulin   


 


Albumin/Globulin Ratio   


 


Venous Blood Potassium    5.0


 


Urine Color   


 


Urine Appearance   


 


Urine pH   


 


Ur Specific Gravity   


 


Urine Protein   


 


Urine Glucose (UA)   


 


Urine Ketones   


 


Urine Blood   


 


Urine Nitrate   


 


Urine Bilirubin   


 


Urine Urobilinogen   


 


Ur Leukocyte Esterase   


 


Urine RBC   


 


Urine WBC   


 


Ur Epithelial Cells   


 


Urine Bacteria   














  04/12/18 04/12/18 04/12/18





  20:00 20:47 22:43


 


WBC   


 


RBC   


 


Hgb   


 


Hct   


 


MCV   


 


MCH   


 


MCHC   


 


RDW   


 


Plt Count   


 


MPV   


 


Gran %   


 


Lymph % (Auto)   


 


Mono % (Auto)   


 


Eos % (Auto)   


 


Baso % (Auto)   


 


Gran #   


 


Lymph # (Auto)   


 


Mono # (Auto)   


 


Eos # (Auto)   


 


Baso # (Auto)   


 


PT   


 


INR   


 


pO2   


 


VBG pH   


 


VBG pCO2   


 


VBG HCO3   


 


VBG Total CO2   


 


VBG O2 Sat (Calc)   


 


VBG Base Excess   


 


VBG Potassium   


 


Sodium  130 L  


 


Chloride  90 L  


 


Glucose   


 


Lactate   


 


FiO2   


 


Potassium  4.8  


 


Carbon Dioxide  22  


 


Anion Gap  22 H  


 


BUN  22 H  


 


Creatinine  1.4 H  


 


Est GFR ( Amer)  47  


 


Est GFR (Non-Af Amer)  39  


 


POC Glucose (mg/dL)   98  154 H


 


Random Glucose  111 H  


 


Calcium  9.8  


 


Total Bilirubin  4.7 H  


 


AST  9793 H  


 


ALT  5948 H  


 


Alkaline Phosphatase  72  


 


Ammonia   


 


Total Protein  6.0  


 


Albumin  3.5  


 


Globulin  2.4  


 


Albumin/Globulin Ratio  1.5  


 


Venous Blood Potassium   


 


Urine Color   


 


Urine Appearance   


 


Urine pH   


 


Ur Specific Gravity   


 


Urine Protein   


 


Urine Glucose (UA)   


 


Urine Ketones   


 


Urine Blood   


 


Urine Nitrate   


 


Urine Bilirubin   


 


Urine Urobilinogen   


 


Ur Leukocyte Esterase   


 


Urine RBC   


 


Urine WBC   


 


Ur Epithelial Cells   


 


Urine Bacteria   














  04/13/18 04/13/18 04/13/18





  00:07 01:55 03:57


 


WBC   


 


RBC   


 


Hgb   


 


Hct   


 


MCV   


 


MCH   


 


MCHC   


 


RDW   


 


Plt Count   


 


MPV   


 


Gran %   


 


Lymph % (Auto)   


 


Mono % (Auto)   


 


Eos % (Auto)   


 


Baso % (Auto)   


 


Gran #   


 


Lymph # (Auto)   


 


Mono # (Auto)   


 


Eos # (Auto)   


 


Baso # (Auto)   


 


PT   


 


INR   


 


pO2   


 


VBG pH   


 


VBG pCO2   


 


VBG HCO3   


 


VBG Total CO2   


 


VBG O2 Sat (Calc)   


 


VBG Base Excess   


 


VBG Potassium   


 


Sodium   


 


Chloride   


 


Glucose   


 


Lactate   


 


FiO2   


 


Potassium   


 


Carbon Dioxide   


 


Anion Gap   


 


BUN   


 


Creatinine   


 


Est GFR ( Amer)   


 


Est GFR (Non-Af Amer)   


 


POC Glucose (mg/dL)  166 H  132 H  132 H


 


Random Glucose   


 


Calcium   


 


Total Bilirubin   


 


AST   


 


ALT   


 


Alkaline Phosphatase   


 


Ammonia   


 


Total Protein   


 


Albumin   


 


Globulin   


 


Albumin/Globulin Ratio   


 


Venous Blood Potassium   


 


Urine Color   


 


Urine Appearance   


 


Urine pH   


 


Ur Specific Gravity   


 


Urine Protein   


 


Urine Glucose (UA)   


 


Urine Ketones   


 


Urine Blood   


 


Urine Nitrate   


 


Urine Bilirubin   


 


Urine Urobilinogen   


 


Ur Leukocyte Esterase   


 


Urine RBC   


 


Urine WBC   


 


Ur Epithelial Cells   


 


Urine Bacteria   














  04/13/18 04/13/18 04/13/18





  05:56 06:00 06:00


 


WBC    16.5 H


 


RBC    3.47 L


 


Hgb    12.0  D


 


Hct    34.9 L


 


MCV    100.6


 


MCH    34.6


 


MCHC    34.4


 


RDW    14.1


 


Plt Count    160


 


MPV    10.2


 


Gran %    96.0 H


 


Lymph % (Auto)    1.9 L


 


Mono % (Auto)    1.9


 


Eos % (Auto)    0.1 L


 


Baso % (Auto)    0.1


 


Gran #    15.87 H


 


Lymph # (Auto)    0.3 L


 


Mono # (Auto)    0.3


 


Eos # (Auto)    0.0


 


Baso # (Auto)    0.01


 


PT   


 


INR   


 


pO2   


 


VBG pH   


 


VBG pCO2   


 


VBG HCO3   


 


VBG Total CO2   


 


VBG O2 Sat (Calc)   


 


VBG Base Excess   


 


VBG Potassium   


 


Sodium   132 


 


Chloride   91 L 


 


Glucose   


 


Lactate   


 


FiO2   


 


Potassium   3.5 L 


 


Carbon Dioxide   30 


 


Anion Gap   15 


 


BUN   20 


 


Creatinine   1.4 H 


 


Est GFR ( Amer)   47 


 


Est GFR (Non-Af Amer)   39 


 


POC Glucose (mg/dL)  133 H  


 


Random Glucose   134 H 


 


Calcium   9.6 


 


Total Bilirubin   4.6 H 


 


AST   6880 H 


 


ALT   4792 H 


 


Alkaline Phosphatase   118 


 


Ammonia   


 


Total Protein   5.9 


 


Albumin   3.5 


 


Globulin   2.5 


 


Albumin/Globulin Ratio   1.4 


 


Venous Blood Potassium   


 


Urine Color   


 


Urine Appearance   


 


Urine pH   


 


Ur Specific Gravity   


 


Urine Protein   


 


Urine Glucose (UA)   


 


Urine Ketones   


 


Urine Blood   


 


Urine Nitrate   


 


Urine Bilirubin   


 


Urine Urobilinogen   


 


Ur Leukocyte Esterase   


 


Urine RBC   


 


Urine WBC   


 


Ur Epithelial Cells   


 


Urine Bacteria   














  04/13/18 04/13/18





  06:00 08:00


 


WBC  


 


RBC  


 


Hgb  


 


Hct  


 


MCV  


 


MCH  


 


MCHC  


 


RDW  


 


Plt Count  


 


MPV  


 


Gran %  


 


Lymph % (Auto)  


 


Mono % (Auto)  


 


Eos % (Auto)  


 


Baso % (Auto)  


 


Gran #  


 


Lymph # (Auto)  


 


Mono # (Auto)  


 


Eos # (Auto)  


 


Baso # (Auto)  


 


PT  39.1 H 


 


INR  3.32 H 


 


pO2  


 


VBG pH  


 


VBG pCO2  


 


VBG HCO3  


 


VBG Total CO2  


 


VBG O2 Sat (Calc)  


 


VBG Base Excess  


 


VBG Potassium  


 


Sodium  


 


Chloride  


 


Glucose  


 


Lactate  


 


FiO2  


 


Potassium  


 


Carbon Dioxide  


 


Anion Gap  


 


BUN  


 


Creatinine  


 


Est GFR ( Amer)  


 


Est GFR (Non-Af Amer)  


 


POC Glucose (mg/dL)   153 H


 


Random Glucose  


 


Calcium  


 


Total Bilirubin  


 


AST  


 


ALT  


 


Alkaline Phosphatase  


 


Ammonia  


 


Total Protein  


 


Albumin  


 


Globulin  


 


Albumin/Globulin Ratio  


 


Venous Blood Potassium  


 


Urine Color  


 


Urine Appearance  


 


Urine pH  


 


Ur Specific Gravity  


 


Urine Protein  


 


Urine Glucose (UA)  


 


Urine Ketones  


 


Urine Blood  


 


Urine Nitrate  


 


Urine Bilirubin  


 


Urine Urobilinogen  


 


Ur Leukocyte Esterase  


 


Urine RBC  


 


Urine WBC  


 


Ur Epithelial Cells  


 


Urine Bacteria  














Critical Care Progress Note





- Nutrition


Nutrition: 


 Nutrition











 Category Date Time Status


 


 NPO Diet [DIET] Diets  04/12/18 Dinner Ordered














Attending/Attestation





- Attestation


I have personally seen and examined this patient.: Yes


I have fully participated in the care of the patient.: Yes


I have reviewed all pertinent clinical information: Yes


Notes (Text): 





04/13/18 10:50


The patient was seen and examined at the bedside. 


Patient care was discussed with resident 


Medical records, lab studies, and imaging were reviewed and management issues 

were discussed and formulated.


Last 24H events reviewed. Agree with above treatment plans as outlined in 

's note with addition of the following:








Liver Failure \ PORSCHE \ ETOH abuse \ Sepsis \ Hypoglycemia \ Coagulopathy \ 





-hemodynamic monitoring to maintain MAP>65


-o2 supplementation to maintain Spo2>90 Pao2>60; currently comfortable on NC


-continue broad spectrum Abx and f\u cultures


-f\u Bun\Cr and U\o; likely hepatorenal syndrome, consider nephrology eval


-GI team f\u; continue lactulose, NAC and steroids


-pt awaiting tfer to [facility]


-monitor INR and for bleeding; s\p Vit K and FFP


-monitor serial BGM; continue D5 1/2NS


-Thiamine, folic acid and MVi


-f\u hepatitis panel; f\u serial LFT


-surgical team f\u


-DVT \ PUD prophylaxis


-Alcohol cessation counseling given


- eval for support





CCM time 38min

## 2018-04-13 NOTE — CP.PCM.PN
Subjective





- Date & Time of Evaluation


Date of Evaluation: 04/13/18


Time of Evaluation: 07:58





- Subjective


Subjective: 





Surgery 





Patient seen and examined. No acute events overnight. Pain controlled and 

improved. Johny nausea, dizziness, diarrea, change in urine color, stool color, 

diarrea. +void. 





Objective





- Vital Signs/Intake and Output


Vital Signs (last 24 hours): 


 











Temp Pulse Resp BP Pulse Ox


 


 97.8 F   80   16   164/56 H  97 


 


 04/12/18 23:15  04/13/18 06:30  04/13/18 06:30  04/13/18 06:00  04/13/18 06:30








Intake and Output: 


 











 04/13/18 04/13/18





 06:59 18:59


 


Intake Total 2188 


 


Output Total 4 


 


Balance 2184 














- Medications


Medications: 


 Current Medications





Piperacillin Sod/Tazobactam Sod (Zosyn 2.25 Gm In 0.9% 100 Ml)  2.25 gm in 100 

mls @ 200 mls/hr IVPB Q6 MELLISSA


   PRN Reason: Protocol


   Last Admin: 04/13/18 05:27 Dose:  200 mls/hr


Acetylcysteine 6,120 mg/ (Dextrose)  1,030.6 mls @ 62.5 mls/hr IVPB ONCE ONE


   Stop: 04/13/18 15:29


   Last Admin: 04/12/18 23:26 Dose:  62.5 mls/hr


Dextrose/Sodium Chloride (Dextrose 5%/0.9% Ns 1000 Ml)  1,000 mls @ 100 mls/hr 

IV .Q10H [facility]


   Last Admin: 04/12/18 21:51 Dose:  100 mls/hr


Lactulose (Enulose)  30 gm PO BID MELLISSA


   Last Admin: 04/12/18 19:42 Dose:  30 gm


Prednisolone (Prednisolone Oral Soln)  40 mg PO DAILY [facility]


   Last Admin: 04/12/18 18:59 Dose:  40 mg











- Labs


Labs: 


 





 04/13/18 06:00 





 04/13/18 06:00 





 











PT  39.1 SECONDS (9.4-12.5)  H  04/13/18  06:00    


 


INR  3.32  (0.93-1.08)  H  04/13/18  06:00    


 


APTT  42.4 Seconds (25.1-36.5)  H  04/12/18  12:08    














- Constitutional


Appears: No Acute Distress





- Head Exam


Head Exam: ATRAUMATIC, NORMAL INSPECTION, NORMOCEPHALIC





- Eye Exam


Eye Exam: EOMI, PERRL, Scleral icterus


Pupil Exam: NORMAL ACCOMODATION, PERRL





- ENT Exam


ENT Exam: Mucous Membranes Moist, Normal Exam





- Neck Exam


Neck Exam: Full ROM, Normal Inspection.  absent: Lymphadenopathy





- Respiratory Exam


Respiratory Exam: Clear to Ausculation Bilateral, NORMAL BREATHING PATTERN





- Cardiovascular Exam


Cardiovascular Exam: REGULAR RHYTHM, +S1, +S2.  absent: Murmur





- GI/Abdominal Exam


GI & Abdominal Exam: Soft, Normal Bowel Sounds.  absent: Tenderness





- Rectal Exam


Rectal Exam: NORMAL INSPECTION





- Extremities Exam


Extremities Exam: Full ROM, Normal Capillary Refill, Normal Inspection.  absent

: Joint Swelling, Pedal Edema





- Back Exam


Back Exam: NORMAL INSPECTION





- Neurological Exam


Neurological Exam: Alert, Awake, CN II-XII Intact, Normal Gait, Oriented x3





- Psychiatric Exam


Psychiatric exam: Normal Affect, Normal Mood





- Skin


Skin Exam: Dry, Intact, Warm.  absent: Erythema


Additional comments: 





Mild juandice 





Assessment and Plan





- Assessment and Plan (Free Text)


Assessment: 





57 year old female with abdominal pain, sepsis and elevated LFT's: clinically 

improving. Labs improving 


EtOH hepatitis 


Acute cholecystitis 


CT: pancreatitis, COlitis


US: Acute cholecystitis and fatty liver


MELD: 40 70 % mortality 





-Trend LFT, lactate


-Continue IVF and antibiotics 


-Pain control


-Cont NAC, lactulose


-Monitor Ammonia level 


-Possible transfer to [facility] 


-Pt is poor surgical candiate and high mortality risk for cholecystectomy at 

this time. Risk outweighs benefits.  


-Pericholecystic fluids found on US likely 2/2 hepatitis.


-GI f/u 


-Will discuss with Dr. Odom

## 2018-04-13 NOTE — CP.PCM.PN
Subjective





- Date & Time of Evaluation


Date of Evaluation: 04/13/18


Time of Evaluation: 07:50





- Subjective


Subjective: 


 Seen and examined at bedside earlier today, chart reviewed. The patient had a 

bowel movement reported to be" mushy no melena or bright red blood.  Her 

abdominal pain has improved it now 4/10.  No  pain meds administered. No acute 

overnight events reported as per nursing.  Status post 1 unit of FFP.





Objective





- Vital Signs/Intake and Output


Vital Signs (last 24 hours): 


 











Temp Pulse Resp BP Pulse Ox


 


 98.4 F   130 H  16   142/73   97 


 


 04/13/18 07:00  04/13/18 10:10  04/13/18 10:10  04/13/18 10:00  04/13/18 10:10








Intake and Output: 


 











 04/13/18 04/13/18





 06:59 18:59


 


Intake Total 2188 


 


Output Total 4 


 


Balance 2184 














- Medications


Medications: 


 Current Medications





Hydralazine HCl (Apresoline)  10 mg IVP Q6 PRN


   PRN Reason: Other


   Last Admin: 04/13/18 09:50 Dose:  10 mg


Piperacillin Sod/Tazobactam Sod (Zosyn 2.25 Gm In 0.9% 100 Ml)  2.25 gm in 100 

mls @ 200 mls/hr IVPB Q6 MELLISSA


   PRN Reason: Protocol


   Last Admin: 04/13/18 05:27 Dose:  200 mls/hr


Acetylcysteine 6,120 mg/ (Dextrose)  1,030.6 mls @ 62.5 mls/hr IVPB ONCE ONE


   Stop: 04/13/18 15:29


   Last Admin: 04/12/18 23:26 Dose:  62.5 mls/hr


Dextrose/Sodium Chloride (Dextrose 5%/0.9% Ns 1000 Ml)  1,000 mls @ 75 mls/hr 

IV .[id number] [facility]


   Last Admin: 04/13/18 09:42 Dose:  75 mls/hr


Lactulose (Enulose)  30 gm PO BID [facility]


   Last Admin: 04/13/18 09:30 Dose:  30 gm


Prednisolone (Prednisolone Oral Soln)  40 mg PO DAILY [facility]


   Last Admin: 04/13/18 09:38 Dose:  40 mg











- Labs


Labs: 


 





 04/13/18 06:00 





 04/13/18 06:00 





 











PT  39.1 SECONDS (9.4-12.5)  H  04/13/18  06:00    


 


INR  3.32  (0.93-1.08)  H  04/13/18  06:00    


 


APTT  42.4 Seconds (25.1-36.5)  H  04/12/18  12:08    














- Constitutional


Appears: No Acute Distress





- Head Exam


Head Exam: NORMOCEPHALIC





- Eye Exam


Eye Exam: Normal appearance.  absent: Scleral icterus





- ENT Exam


ENT Exam: Mucous Membranes Moist





- Neck Exam


Neck Exam: Normal Inspection





- Respiratory Exam


Respiratory Exam: NORMAL BREATHING PATTERN.  absent: Respiratory Distress





- Cardiovascular Exam


Cardiovascular Exam: +S1, +S2





- GI/Abdominal Exam


GI & Abdominal Exam: Soft, Tenderness (tenderness), Normal Bowel Sounds.  absent

: Guarding, Organomegaly, Rebound





- Extremities Exam


Extremities Exam: absent: Calf Tenderness, Pedal Edema





- Neurological Exam


Neurological Exam: Alert, Awake, Oriented x3





- Skin


Skin Exam: Dry, Warm





Assessment and Plan





- Assessment and Plan (Free Text)


Assessment: 





Assessment:





Sepsis


Acute liver failure,meld score 34, discriminate factor to 228


Chronic history of EtOH


Coagulopathy, s/p Vit K and FFP





PLAN:





NPO, IVF for hydration


on Acetyclystene drip


Continue PPI


On Prednisalone 40 daily


Enulose BID


monitor LFT, FU hepatitis panel


Pending transfer to [facility]


as per ICU team





Seen and discussed w/ Dr. Shukla.

## 2018-05-29 NOTE — HP
HISTORY OF PRESENT ILLNESS:  The patient is a 57-year-old female who

presented to the emergency room complaining of a 4-day history of upper

abdominal and back pain, which later progressed to the lower abdomen.  She

also complained of some chest pain radiating down to her left arm.  There

was no shortness of breath.  The patient smokes at least one pack per day

and also has a history of "drinking" off and on.  She also states that over

the past several days, she has been feeling tired and was too weak to walk

down two flights of stairs.  She reports some dark stools at times.  She

denies headaches, arm or leg weakness.  Her last drink was on Monday.



PAST MEDICAL HISTORY:  Noncontributory.  There is no cardiac history.



PAST SURGICAL HISTORY:  No previous surgical history.



SOCIAL HISTORY:  Patient is a smoker and does use alcoholic beverages.



ALLERGIES:  ARE NO KNOWN ALLERGIES.



PHYSICAL EXAMINATION:

VITAL SIGNS:  Afebrile.  Pulse rate of 82, blood pressure 158/85,

respiratory rate of 16, O2 saturation of 96% on room air.

HEENT:  PERRLA, EOMI.  There is very slight icterus present.

NECK:  Supple with full range of motion.  No bruits or jugular venous

distention are appreciated.

LUNGS:  Clear to auscultation and percussion.

HEART:  Regular rate and rhythm with no murmurs, rubs, or gallops.

ABDOMEN:  Soft.  It is nontender.  The bowel sounds are normoactive.

EXTREMITIES:  Show no deformity or edema.

NEUROLOGICAL:  There are no focal deficits.



LABORATORY DATA:  Lab values, at this time, showing WBC of 16.5 which is

down from yesterday evening's 18.8.  There are 96% granulocytes.  Blood gas

yesterday evening was O2 of 71, CO2 of 32 with a pH of 7.46.  Her chemistry

this morning is essentially normal with a potassium of 3.5, creatinine of

1.4, and a random glucose of 134.  Her total bilirubin is down to 4.6 with

an AST and ALT of 6880 and 4792.



Abdominal ultrasound shows a gallbladder that could represent acute

cholecystitis.  Dr. Shukla was consulted and he saw the patient yesterday

evening and he recommended transferring her to the [facility].  We

will continue to follow the patient.



CURRENT DIAGNOSIS:  Acute hepatitis secondary to alcohol abuse.







__________________________________________

Tank Eldridge MD



DD:  04/13/2018 9:26:10

DT:  04/13/2018 12:23:09

[facility] # [id number] Normal rate, regular rhythm.  Heart sounds S1, S2.  No murmurs, rubs or gallops.